# Patient Record
Sex: FEMALE | Race: ASIAN | NOT HISPANIC OR LATINO | Employment: FULL TIME
[De-identification: names, ages, dates, MRNs, and addresses within clinical notes are randomized per-mention and may not be internally consistent; named-entity substitution may affect disease eponyms.]

---

## 2017-01-05 ENCOUNTER — RECORDS - HEALTHEAST (OUTPATIENT)
Dept: ADMINISTRATIVE | Facility: OTHER | Age: 37
End: 2017-01-05

## 2017-01-13 ENCOUNTER — RECORDS - HEALTHEAST (OUTPATIENT)
Dept: ADMINISTRATIVE | Facility: OTHER | Age: 37
End: 2017-01-13

## 2017-01-20 ENCOUNTER — AMBULATORY - HEALTHEAST (OUTPATIENT)
Dept: EDUCATION SERVICES | Facility: CLINIC | Age: 37
End: 2017-01-20

## 2017-02-24 ENCOUNTER — OFFICE VISIT - HEALTHEAST (OUTPATIENT)
Dept: FAMILY MEDICINE | Facility: CLINIC | Age: 37
End: 2017-02-24

## 2017-02-24 DIAGNOSIS — E11.9 TYPE 2 DIABETES MELLITUS WITHOUT COMPLICATION, WITHOUT LONG-TERM CURRENT USE OF INSULIN (H): ICD-10-CM

## 2017-02-24 DIAGNOSIS — Z31.69 INFERTILITY COUNSELING: ICD-10-CM

## 2017-02-24 DIAGNOSIS — E08.65 DIABETES MELLITUS DUE TO UNDERLYING CONDITION WITH HYPERGLYCEMIA (H): ICD-10-CM

## 2017-02-24 DIAGNOSIS — A56.02 CHLAMYDIA VAGINITIS/CERVICITIS: ICD-10-CM

## 2017-02-24 DIAGNOSIS — A56.09 CHLAMYDIA VAGINITIS/CERVICITIS: ICD-10-CM

## 2017-02-24 DIAGNOSIS — Z31.69 ENCOUNTER FOR PRECONCEPTION CONSULTATION: ICD-10-CM

## 2017-02-24 DIAGNOSIS — E66.811 OBESITY (BMI 30.0-34.9): ICD-10-CM

## 2017-02-24 LAB — HBA1C MFR BLD: 6.8 % (ref 3.5–6)

## 2017-02-24 ASSESSMENT — MIFFLIN-ST. JEOR: SCORE: 1386.42

## 2017-02-27 ENCOUNTER — COMMUNICATION - HEALTHEAST (OUTPATIENT)
Dept: FAMILY MEDICINE | Facility: CLINIC | Age: 37
End: 2017-02-27

## 2017-02-28 ENCOUNTER — COMMUNICATION - HEALTHEAST (OUTPATIENT)
Dept: FAMILY MEDICINE | Facility: CLINIC | Age: 37
End: 2017-02-28

## 2017-02-28 ENCOUNTER — RECORDS - HEALTHEAST (OUTPATIENT)
Dept: ADMINISTRATIVE | Facility: OTHER | Age: 37
End: 2017-02-28

## 2017-03-02 ENCOUNTER — HOSPITAL ENCOUNTER (OUTPATIENT)
Dept: RADIOLOGY | Facility: HOSPITAL | Age: 37
Discharge: HOME | End: 2017-03-02
Attending: INTERPRETER

## 2017-03-02 DIAGNOSIS — Z31.49 ENCOUNTER FOR INVESTIGATION AND TESTING FOR PROCREATIVE MANAGEMENT: ICD-10-CM

## 2017-03-16 ENCOUNTER — RECORDS - HEALTHEAST (OUTPATIENT)
Dept: ADMINISTRATIVE | Facility: OTHER | Age: 37
End: 2017-03-16

## 2017-04-21 ENCOUNTER — COMMUNICATION - HEALTHEAST (OUTPATIENT)
Dept: FAMILY MEDICINE | Facility: CLINIC | Age: 37
End: 2017-04-21

## 2017-04-21 ENCOUNTER — OFFICE VISIT - HEALTHEAST (OUTPATIENT)
Dept: EDUCATION SERVICES | Facility: CLINIC | Age: 37
End: 2017-04-21

## 2017-04-21 DIAGNOSIS — Z34.90 PREGNANCY: ICD-10-CM

## 2017-07-24 ENCOUNTER — OFFICE VISIT - HEALTHEAST (OUTPATIENT)
Dept: FAMILY MEDICINE | Facility: CLINIC | Age: 37
End: 2017-07-24

## 2017-07-24 DIAGNOSIS — Z34.91 SUPERVISION OF NORMAL PREGNANCY IN FIRST TRIMESTER: ICD-10-CM

## 2017-07-24 DIAGNOSIS — Z34.91 NORMAL PREGNANCY IN FIRST TRIMESTER: ICD-10-CM

## 2017-07-24 DIAGNOSIS — E11.9 TYPE 2 DIABETES MELLITUS (H): ICD-10-CM

## 2017-07-24 DIAGNOSIS — M54.50 LOWER BACK PAIN: ICD-10-CM

## 2017-07-24 ASSESSMENT — MIFFLIN-ST. JEOR: SCORE: 1368.28

## 2017-07-26 ENCOUNTER — AMBULATORY - HEALTHEAST (OUTPATIENT)
Dept: LAB | Facility: CLINIC | Age: 37
End: 2017-07-26

## 2017-07-26 DIAGNOSIS — M54.50 LOWER BACK PAIN: ICD-10-CM

## 2017-07-26 DIAGNOSIS — E11.9 TYPE 2 DIABETES MELLITUS (H): ICD-10-CM

## 2017-07-26 DIAGNOSIS — Z34.91 SUPERVISION OF NORMAL PREGNANCY IN FIRST TRIMESTER: ICD-10-CM

## 2017-07-26 LAB
HBA1C MFR BLD: 6.8 % (ref 3.5–6)
HIV 1+2 AB+HIV1 P24 AG SERPL QL IA: NEGATIVE

## 2017-07-27 LAB
HBV SURFACE AG SERPL QL IA: NEGATIVE
SYPHILIS RPR SCREEN - HISTORICAL: NORMAL

## 2017-08-01 ENCOUNTER — OFFICE VISIT - HEALTHEAST (OUTPATIENT)
Dept: FAMILY MEDICINE | Facility: CLINIC | Age: 37
End: 2017-08-01

## 2017-08-01 DIAGNOSIS — Z34.90 PREGNANCY: ICD-10-CM

## 2017-08-01 DIAGNOSIS — R31.9 HEMATURIA: ICD-10-CM

## 2017-08-02 ENCOUNTER — PRENATAL OFFICE VISIT - HEALTHEAST (OUTPATIENT)
Dept: FAMILY MEDICINE | Facility: CLINIC | Age: 37
End: 2017-08-02

## 2017-08-02 DIAGNOSIS — E11.9 TYPE 2 DIABETES MELLITUS (H): ICD-10-CM

## 2017-08-02 DIAGNOSIS — Z34.91 NORMAL PREGNANCY IN FIRST TRIMESTER: ICD-10-CM

## 2017-08-04 ENCOUNTER — AMBULATORY - HEALTHEAST (OUTPATIENT)
Dept: FAMILY MEDICINE | Facility: CLINIC | Age: 37
End: 2017-08-04

## 2017-08-04 ENCOUNTER — AMBULATORY - HEALTHEAST (OUTPATIENT)
Dept: LAB | Facility: CLINIC | Age: 37
End: 2017-08-04

## 2017-08-04 DIAGNOSIS — Z34.91 NORMAL PREGNANCY IN FIRST TRIMESTER: ICD-10-CM

## 2017-08-11 ENCOUNTER — AMBULATORY - HEALTHEAST (OUTPATIENT)
Dept: EDUCATION SERVICES | Facility: CLINIC | Age: 37
End: 2017-08-11

## 2017-08-11 DIAGNOSIS — E08.65 DIABETES MELLITUS DUE TO UNDERLYING CONDITION WITH HYPERGLYCEMIA (H): ICD-10-CM

## 2017-08-14 ENCOUNTER — COMMUNICATION - HEALTHEAST (OUTPATIENT)
Dept: ADMINISTRATIVE | Facility: CLINIC | Age: 37
End: 2017-08-14

## 2017-08-17 ENCOUNTER — OFFICE VISIT - HEALTHEAST (OUTPATIENT)
Dept: EDUCATION SERVICES | Facility: CLINIC | Age: 37
End: 2017-08-17

## 2017-08-21 ENCOUNTER — AMBULATORY - HEALTHEAST (OUTPATIENT)
Dept: OBGYN | Facility: CLINIC | Age: 37
End: 2017-08-21

## 2017-08-21 ENCOUNTER — HOSPITAL ENCOUNTER (OUTPATIENT)
Dept: ULTRASOUND IMAGING | Facility: HOSPITAL | Age: 37
Discharge: HOME | End: 2017-08-21
Attending: FAMILY MEDICINE

## 2017-08-21 DIAGNOSIS — Z34.91 SUPERVISION OF NORMAL PREGNANCY IN FIRST TRIMESTER: ICD-10-CM

## 2017-08-24 ENCOUNTER — COMMUNICATION - HEALTHEAST (OUTPATIENT)
Dept: ADMINISTRATIVE | Facility: CLINIC | Age: 37
End: 2017-08-24

## 2017-08-24 ENCOUNTER — COMMUNICATION - HEALTHEAST (OUTPATIENT)
Dept: OBGYN | Facility: CLINIC | Age: 37
End: 2017-08-24

## 2017-08-31 ENCOUNTER — PRENATAL OFFICE VISIT - HEALTHEAST (OUTPATIENT)
Dept: FAMILY MEDICINE | Facility: CLINIC | Age: 37
End: 2017-08-31

## 2017-08-31 DIAGNOSIS — Z98.891 HISTORY OF CLASSICAL CESAREAN SECTION: ICD-10-CM

## 2017-08-31 DIAGNOSIS — J06.9 UPPER RESPIRATORY INFECTION: ICD-10-CM

## 2017-08-31 DIAGNOSIS — E11.9 TYPE 2 DIABETES MELLITUS (H): ICD-10-CM

## 2017-08-31 DIAGNOSIS — Z34.91 NORMAL PREGNANCY IN FIRST TRIMESTER: ICD-10-CM

## 2017-08-31 DIAGNOSIS — O09.91 HIGH-RISK PREGNANCY, FIRST TRIMESTER: ICD-10-CM

## 2017-08-31 ASSESSMENT — MIFFLIN-ST. JEOR: SCORE: 1368.85

## 2017-09-07 LAB
HPV INTERPRETATION - HISTORICAL: NORMAL
HPV INTERPRETER - HISTORICAL: NORMAL

## 2017-09-13 LAB
BKR LAB AP ABNORMAL BLEEDING: NO
BKR LAB AP BIRTH CONTROL/HORMONES: ABNORMAL
BKR LAB AP CERVICAL APPEARANCE: NORMAL
BKR LAB AP GYN ADEQUACY: ABNORMAL
BKR LAB AP GYN INTERPRETATION: ABNORMAL
BKR LAB AP HPV REFLEX: ABNORMAL
BKR LAB AP LMP: ABNORMAL
BKR LAB AP PATIENT STATUS: ABNORMAL
BKR LAB AP PREVIOUS ABNORMAL: ABNORMAL
BKR LAB AP PREVIOUS NORMAL: ABNORMAL
HIGH RISK?: NO
INTERPRETING LAB: ABNORMAL
PATH REPORT.COMMENTS IMP SPEC: ABNORMAL
RESULT FLAG (HE HISTORICAL CONVERSION): ABNORMAL

## 2017-09-14 ENCOUNTER — COMMUNICATION - HEALTHEAST (OUTPATIENT)
Dept: FAMILY MEDICINE | Facility: CLINIC | Age: 37
End: 2017-09-14

## 2017-09-15 ENCOUNTER — OFFICE VISIT - HEALTHEAST (OUTPATIENT)
Dept: FAMILY MEDICINE | Facility: CLINIC | Age: 37
End: 2017-09-15

## 2017-09-15 DIAGNOSIS — R87.612 PAP SMEAR ABNORMALITY OF CERVIX WITH LGSIL: ICD-10-CM

## 2017-09-15 DIAGNOSIS — O09.91 HIGH-RISK PREGNANCY, FIRST TRIMESTER: ICD-10-CM

## 2017-09-15 ASSESSMENT — MIFFLIN-ST. JEOR: SCORE: 1368.85

## 2017-09-22 ENCOUNTER — COMMUNICATION - HEALTHEAST (OUTPATIENT)
Dept: FAMILY MEDICINE | Facility: CLINIC | Age: 37
End: 2017-09-22

## 2017-09-22 DIAGNOSIS — E11.9 TYPE 2 DIABETES MELLITUS (H): ICD-10-CM

## 2017-09-22 DIAGNOSIS — R87.612 PAP SMEAR ABNORMALITY OF CERVIX WITH LGSIL: ICD-10-CM

## 2017-09-22 DIAGNOSIS — O09.91 HIGH-RISK PREGNANCY, FIRST TRIMESTER: ICD-10-CM

## 2017-09-25 ENCOUNTER — COMMUNICATION - HEALTHEAST (OUTPATIENT)
Dept: ADMINISTRATIVE | Facility: CLINIC | Age: 37
End: 2017-09-25

## 2017-10-06 ENCOUNTER — COMMUNICATION - HEALTHEAST (OUTPATIENT)
Dept: FAMILY MEDICINE | Facility: CLINIC | Age: 37
End: 2017-10-06

## 2017-10-12 ENCOUNTER — COMMUNICATION - HEALTHEAST (OUTPATIENT)
Dept: FAMILY MEDICINE | Facility: CLINIC | Age: 37
End: 2017-10-12

## 2017-10-16 ENCOUNTER — COMMUNICATION - HEALTHEAST (OUTPATIENT)
Dept: FAMILY MEDICINE | Facility: CLINIC | Age: 37
End: 2017-10-16

## 2017-10-23 ENCOUNTER — TRANSFERRED RECORDS (OUTPATIENT)
Dept: HEALTH INFORMATION MANAGEMENT | Facility: CLINIC | Age: 37
End: 2017-10-23

## 2017-10-23 ENCOUNTER — MEDICAL CORRESPONDENCE (OUTPATIENT)
Dept: HEALTH INFORMATION MANAGEMENT | Facility: CLINIC | Age: 37
End: 2017-10-23

## 2017-10-26 ENCOUNTER — RECORDS - HEALTHEAST (OUTPATIENT)
Dept: ADMINISTRATIVE | Facility: OTHER | Age: 37
End: 2017-10-26

## 2017-10-26 ENCOUNTER — OFFICE VISIT - HEALTHEAST (OUTPATIENT)
Dept: EDUCATION SERVICES | Facility: CLINIC | Age: 37
End: 2017-10-26

## 2017-10-26 DIAGNOSIS — O24.112 PRE-EXISTING TYPE 2 DIABETES MELLITUS DURING PREGNANCY IN SECOND TRIMESTER: ICD-10-CM

## 2017-10-26 DIAGNOSIS — E11.9 DIABETES (H): ICD-10-CM

## 2017-10-26 LAB — HBA1C MFR BLD: 6 % (ref 3.5–6)

## 2017-11-01 ENCOUNTER — AMBULATORY - HEALTHEAST (OUTPATIENT)
Dept: EDUCATION SERVICES | Facility: CLINIC | Age: 37
End: 2017-11-01

## 2017-11-01 DIAGNOSIS — O24.419 GESTATIONAL DIABETES: ICD-10-CM

## 2017-11-06 ENCOUNTER — RECORDS - HEALTHEAST (OUTPATIENT)
Dept: ADMINISTRATIVE | Facility: OTHER | Age: 37
End: 2017-11-06

## 2017-12-01 ENCOUNTER — COMMUNICATION - HEALTHEAST (OUTPATIENT)
Dept: FAMILY MEDICINE | Facility: CLINIC | Age: 37
End: 2017-12-01

## 2017-12-05 ENCOUNTER — TELEPHONE (OUTPATIENT)
Dept: MATERNAL FETAL MEDICINE | Facility: CLINIC | Age: 37
End: 2017-12-05

## 2017-12-05 ENCOUNTER — COMMUNICATION - HEALTHEAST (OUTPATIENT)
Dept: FAMILY MEDICINE | Facility: CLINIC | Age: 37
End: 2017-12-05

## 2017-12-05 DIAGNOSIS — O43.219 PLACENTA ACCRETA: ICD-10-CM

## 2017-12-05 DIAGNOSIS — O09.91 HIGH-RISK PREGNANCY IN FIRST TRIMESTER: ICD-10-CM

## 2017-12-05 DIAGNOSIS — E28.2 POLYCYSTIC OVARIAN SYNDROME: ICD-10-CM

## 2017-12-05 DIAGNOSIS — O34.10 UTERINE FIBROIDS AFFECTING PREGNANCY: ICD-10-CM

## 2017-12-05 DIAGNOSIS — Z98.891 HISTORY OF CLASSICAL CESAREAN SECTION: ICD-10-CM

## 2017-12-05 DIAGNOSIS — D25.9 UTERINE FIBROIDS AFFECTING PREGNANCY: ICD-10-CM

## 2017-12-05 DIAGNOSIS — R87.612 PAP SMEAR ABNORMALITY OF CERVIX WITH LGSIL: ICD-10-CM

## 2017-12-05 DIAGNOSIS — E66.811 OBESITY (BMI 30.0-34.9): ICD-10-CM

## 2017-12-05 DIAGNOSIS — Z64.1 GRAND MULTIPARITY: ICD-10-CM

## 2017-12-05 DIAGNOSIS — E11.65 TYPE 2 DIABETES MELLITUS WITH HYPERGLYCEMIA (H): ICD-10-CM

## 2017-12-05 NOTE — TELEPHONE ENCOUNTER
Writer called provider wanting to know if provider still wants MFM to see patient. Last phone conversation was on 11/14 regarding patient not wanting to come to [facility], and did not understand why she needs to come here when she is seeing her OB.     Left message and phone number for provider to call back.    eJnnifer Link,    , [facility]

## 2017-12-11 ENCOUNTER — TELEPHONE (OUTPATIENT)
Dept: MATERNAL FETAL MEDICINE | Facility: CLINIC | Age: 37
End: 2017-12-11

## 2017-12-11 NOTE — TELEPHONE ENCOUNTER
[facility] received a referral from provider wanting patient to be seen for GC/US. Upon reaching patient x2, she refuse to come to [facility] not knowing why she is being referred over. On 11/4, [facility]  called provider and asking if they could talk to with patient in regards of the referral and ask to call MFM back. MFM have not heard back yet.     12/5, [facility]  called provider in regards to phone call on 11/4, and left msg for provider could call back. However, [facility]  did not receive a call back and called a week after leaving another message to the provider. 2/11, [facility]  called clinic again but was not able to touch base with provider.  Left message again letting provider know that patient's order will be removed and for provider to resubmit another referral to [facility] when patient is ready.      Referring clinic notified. Removing order.    Jennifer Link,    , [facility]

## 2018-01-31 ENCOUNTER — AMBULATORY - HEALTHEAST (OUTPATIENT)
Dept: EDUCATION SERVICES | Facility: CLINIC | Age: 38
End: 2018-01-31

## 2018-01-31 DIAGNOSIS — E11.9 DIABETES (H): ICD-10-CM

## 2018-01-31 DIAGNOSIS — O24.414 INSULIN CONTROLLED GESTATIONAL DIABETES MELLITUS (GDM) DURING PREGNANCY, ANTEPARTUM: ICD-10-CM

## 2018-02-01 ENCOUNTER — COMMUNICATION - HEALTHEAST (OUTPATIENT)
Dept: ENDOCRINOLOGY | Facility: CLINIC | Age: 38
End: 2018-02-01

## 2018-02-01 ENCOUNTER — OFFICE VISIT - HEALTHEAST (OUTPATIENT)
Dept: ENDOCRINOLOGY | Facility: CLINIC | Age: 38
End: 2018-02-01

## 2018-02-01 ENCOUNTER — OFFICE VISIT - HEALTHEAST (OUTPATIENT)
Dept: EDUCATION SERVICES | Facility: CLINIC | Age: 38
End: 2018-02-01

## 2018-02-01 DIAGNOSIS — E11.65 TYPE 2 DIABETES MELLITUS WITH HYPERGLYCEMIA (H): ICD-10-CM

## 2018-02-01 DIAGNOSIS — O24.419 GESTATIONAL DIABETES: ICD-10-CM

## 2018-02-01 DIAGNOSIS — O24.414 INSULIN CONTROLLED GESTATIONAL DIABETES MELLITUS (GDM) IN THIRD TRIMESTER: ICD-10-CM

## 2018-02-01 DIAGNOSIS — Z3A.30 30 WEEKS GESTATION OF PREGNANCY: ICD-10-CM

## 2018-02-01 DIAGNOSIS — E11.9 DIABETES (H): ICD-10-CM

## 2018-02-01 DIAGNOSIS — O24.414 INSULIN CONTROLLED GESTATIONAL DIABETES MELLITUS (GDM) DURING PREGNANCY, ANTEPARTUM: ICD-10-CM

## 2018-02-01 ASSESSMENT — MIFFLIN-ST. JEOR: SCORE: 1448.8

## 2018-02-02 ENCOUNTER — COMMUNICATION - HEALTHEAST (OUTPATIENT)
Dept: LAB | Facility: CLINIC | Age: 38
End: 2018-02-02

## 2018-02-02 ENCOUNTER — AMBULATORY - HEALTHEAST (OUTPATIENT)
Dept: ENDOCRINOLOGY | Facility: CLINIC | Age: 38
End: 2018-02-02

## 2018-02-02 DIAGNOSIS — E11.65 TYPE 2 DIABETES MELLITUS WITH HYPERGLYCEMIA (H): ICD-10-CM

## 2018-03-07 ASSESSMENT — MIFFLIN-ST. JEOR: SCORE: 1525.91

## 2018-03-08 ENCOUNTER — RECORDS - HEALTHEAST (OUTPATIENT)
Dept: ADMINISTRATIVE | Facility: OTHER | Age: 38
End: 2018-03-08

## 2018-03-08 ENCOUNTER — SURGERY - HEALTHEAST (OUTPATIENT)
Dept: OBGYN | Facility: HOSPITAL | Age: 38
End: 2018-03-08

## 2018-03-08 ENCOUNTER — ANESTHESIA - HEALTHEAST (OUTPATIENT)
Dept: OBGYN | Facility: HOSPITAL | Age: 38
End: 2018-03-08

## 2018-03-08 ASSESSMENT — MIFFLIN-ST. JEOR: SCORE: 1519.1

## 2018-03-13 ENCOUNTER — HOME CARE/HOSPICE - HEALTHEAST (OUTPATIENT)
Dept: HOME HEALTH SERVICES | Facility: HOME HEALTH | Age: 38
End: 2018-03-13

## 2018-03-16 ENCOUNTER — HOME CARE/HOSPICE - HEALTHEAST (OUTPATIENT)
Dept: HOME HEALTH SERVICES | Facility: HOME HEALTH | Age: 38
End: 2018-03-16

## 2018-05-10 ENCOUNTER — OFFICE VISIT - HEALTHEAST (OUTPATIENT)
Dept: FAMILY MEDICINE | Facility: CLINIC | Age: 38
End: 2018-05-10

## 2018-05-10 DIAGNOSIS — E08.65 DIABETES MELLITUS DUE TO UNDERLYING CONDITION WITH HYPERGLYCEMIA (H): ICD-10-CM

## 2018-05-10 DIAGNOSIS — R87.612 PAP SMEAR ABNORMALITY OF CERVIX WITH LGSIL: ICD-10-CM

## 2018-05-10 DIAGNOSIS — E11.65 TYPE 2 DIABETES MELLITUS WITH HYPERGLYCEMIA (H): ICD-10-CM

## 2018-05-10 DIAGNOSIS — E78.5 HYPERLIPIDEMIA: ICD-10-CM

## 2018-05-10 DIAGNOSIS — N93.9 ABNORMAL UTERINE BLEEDING: ICD-10-CM

## 2018-05-10 LAB
ALBUMIN SERPL-MCNC: 4 G/DL (ref 3.5–5)
ALP SERPL-CCNC: 101 U/L (ref 45–120)
ALT SERPL W P-5'-P-CCNC: 24 U/L (ref 0–45)
ANION GAP SERPL CALCULATED.3IONS-SCNC: 8 MMOL/L (ref 5–18)
AST SERPL W P-5'-P-CCNC: 15 U/L (ref 0–40)
BILIRUB SERPL-MCNC: 0.6 MG/DL (ref 0–1)
BUN SERPL-MCNC: 13 MG/DL (ref 8–22)
CALCIUM SERPL-MCNC: 9.2 MG/DL (ref 8.5–10.5)
CHLORIDE BLD-SCNC: 107 MMOL/L (ref 98–107)
CHOLEST SERPL-MCNC: 327 MG/DL
CLUE CELLS: NORMAL
CO2 SERPL-SCNC: 25 MMOL/L (ref 22–31)
CREAT SERPL-MCNC: 0.6 MG/DL (ref 0.6–1.1)
CREAT UR-MCNC: 85 MG/DL
ERYTHROCYTE [DISTWIDTH] IN BLOOD BY AUTOMATED COUNT: 11.3 % (ref 11–14.5)
FASTING STATUS PATIENT QL REPORTED: NO
GFR SERPL CREATININE-BSD FRML MDRD: >60 ML/MIN/1.73M2
GLUCOSE BLD-MCNC: 135 MG/DL (ref 70–125)
HBA1C MFR BLD: 7.1 % (ref 3.5–6)
HCG UR QL: NEGATIVE
HCT VFR BLD AUTO: 37 % (ref 35–47)
HDLC SERPL-MCNC: 53 MG/DL
HGB BLD-MCNC: 11.9 G/DL (ref 12–16)
LDLC SERPL CALC-MCNC: 248 MG/DL
MCH RBC QN AUTO: 28.9 PG (ref 27–34)
MCHC RBC AUTO-ENTMCNC: 32.1 G/DL (ref 32–36)
MCV RBC AUTO: 90 FL (ref 80–100)
MICROALBUMIN UR-MCNC: 0.86 MG/DL (ref 0–1.99)
MICROALBUMIN/CREAT UR: 10.1 MG/G
PLATELET # BLD AUTO: 257 THOU/UL (ref 140–440)
PMV BLD AUTO: 6.5 FL (ref 7–10)
POTASSIUM BLD-SCNC: 3.9 MMOL/L (ref 3.5–5)
PROT SERPL-MCNC: 7.2 G/DL (ref 6–8)
RBC # BLD AUTO: 4.11 MILL/UL (ref 3.8–5.4)
SODIUM SERPL-SCNC: 140 MMOL/L (ref 136–145)
SP GR UR STRIP: 1.02 (ref 1–1.03)
TRICHOMONAS, WET PREP: NORMAL
TRIGL SERPL-MCNC: 131 MG/DL
TSH SERPL DL<=0.005 MIU/L-ACNC: 2.46 UIU/ML (ref 0.3–5)
WBC: 5.5 THOU/UL (ref 4–11)
YEAST, WET PREP: NORMAL

## 2018-05-10 RX ORDER — GLUCOSAMINE HCL/CHONDROITIN SU 500-400 MG
CAPSULE ORAL
Qty: 120 STRIP | Refills: 10 | Status: SHIPPED | OUTPATIENT
Start: 2018-05-10

## 2018-05-10 ASSESSMENT — MIFFLIN-ST. JEOR: SCORE: 1396.64

## 2018-05-11 ENCOUNTER — COMMUNICATION - HEALTHEAST (OUTPATIENT)
Dept: FAMILY MEDICINE | Facility: CLINIC | Age: 38
End: 2018-05-11

## 2018-05-11 LAB
C TRACH DNA SPEC QL PROBE+SIG AMP: NEGATIVE
HPV SOURCE: NORMAL
HUMAN PAPILLOMA VIRUS 16 DNA: NEGATIVE
HUMAN PAPILLOMA VIRUS 18 DNA: NEGATIVE
HUMAN PAPILLOMA VIRUS FINAL DIAGNOSIS: NORMAL
HUMAN PAPILLOMA VIRUS OTHER HR: NEGATIVE
N GONORRHOEA DNA SPEC QL NAA+PROBE: NEGATIVE
SPECIMEN DESCRIPTION: NORMAL

## 2018-05-17 ENCOUNTER — OFFICE VISIT - HEALTHEAST (OUTPATIENT)
Dept: FAMILY MEDICINE | Facility: CLINIC | Age: 38
End: 2018-05-17

## 2018-05-17 DIAGNOSIS — Z23 NEED FOR HEPATITIS B VACCINATION: ICD-10-CM

## 2018-05-17 DIAGNOSIS — R87.612 PAP SMEAR ABNORMALITY OF CERVIX WITH LGSIL: ICD-10-CM

## 2018-05-17 DIAGNOSIS — E78.5 HYPERLIPIDEMIA: ICD-10-CM

## 2018-05-17 DIAGNOSIS — E11.65 TYPE 2 DIABETES MELLITUS WITH HYPERGLYCEMIA, WITHOUT LONG-TERM CURRENT USE OF INSULIN (H): ICD-10-CM

## 2018-05-17 DIAGNOSIS — D50.9 IRON DEFICIENCY ANEMIA: ICD-10-CM

## 2018-05-17 LAB
BKR LAB AP ABNORMAL BLEEDING: YES
BKR LAB AP BIRTH CONTROL/HORMONES: NORMAL
BKR LAB AP CERVICAL APPEARANCE: NORMAL
BKR LAB AP GYN ADEQUACY: NORMAL
BKR LAB AP GYN INTERPRETATION: NORMAL
BKR LAB AP HPV REFLEX: NORMAL
BKR LAB AP LMP: NORMAL
BKR LAB AP PATIENT STATUS: NORMAL
BKR LAB AP PREVIOUS ABNORMAL: NORMAL
BKR LAB AP PREVIOUS NORMAL: NORMAL
CHOLEST SERPL-MCNC: 314 MG/DL
FASTING STATUS PATIENT QL REPORTED: YES
HDLC SERPL-MCNC: 62 MG/DL
HIGH RISK?: YES
LDLC SERPL CALC-MCNC: 221 MG/DL
PATH REPORT.COMMENTS IMP SPEC: NORMAL
RESULT FLAG (HE HISTORICAL CONVERSION): NORMAL
TRIGL SERPL-MCNC: 156 MG/DL

## 2018-05-18 ENCOUNTER — COMMUNICATION - HEALTHEAST (OUTPATIENT)
Dept: FAMILY MEDICINE | Facility: CLINIC | Age: 38
End: 2018-05-18

## 2018-06-26 ENCOUNTER — COMMUNICATION - HEALTHEAST (OUTPATIENT)
Dept: FAMILY MEDICINE | Facility: CLINIC | Age: 38
End: 2018-06-26

## 2018-08-17 ENCOUNTER — AMBULATORY - HEALTHEAST (OUTPATIENT)
Dept: FAMILY MEDICINE | Facility: CLINIC | Age: 38
End: 2018-08-17

## 2018-08-17 DIAGNOSIS — E78.49 FAMILIAL HYPERLIPIDEMIA, HIGH LDL: ICD-10-CM

## 2018-08-17 DIAGNOSIS — D50.9 IRON DEFICIENCY ANEMIA: ICD-10-CM

## 2018-08-17 DIAGNOSIS — E11.65 TYPE 2 DIABETES MELLITUS WITH HYPERGLYCEMIA, WITHOUT LONG-TERM CURRENT USE OF INSULIN (H): ICD-10-CM

## 2018-11-19 ENCOUNTER — COMMUNICATION - HEALTHEAST (OUTPATIENT)
Dept: FAMILY MEDICINE | Facility: CLINIC | Age: 38
End: 2018-11-19

## 2018-11-19 ENCOUNTER — OFFICE VISIT - HEALTHEAST (OUTPATIENT)
Dept: FAMILY MEDICINE | Facility: CLINIC | Age: 38
End: 2018-11-19

## 2018-11-19 DIAGNOSIS — E66.811 OBESITY (BMI 30.0-34.9): ICD-10-CM

## 2018-11-19 DIAGNOSIS — E11.65 TYPE 2 DIABETES MELLITUS WITH HYPERGLYCEMIA, WITHOUT LONG-TERM CURRENT USE OF INSULIN (H): ICD-10-CM

## 2018-11-19 DIAGNOSIS — D50.0 IRON DEFICIENCY ANEMIA DUE TO CHRONIC BLOOD LOSS: ICD-10-CM

## 2018-11-19 DIAGNOSIS — R87.612 PAP SMEAR ABNORMALITY OF CERVIX WITH LGSIL: ICD-10-CM

## 2018-11-19 DIAGNOSIS — N93.9 ABNORMAL UTERINE BLEEDING: ICD-10-CM

## 2018-11-19 DIAGNOSIS — Z23 NEED FOR HEPATITIS B VACCINATION: ICD-10-CM

## 2018-11-19 DIAGNOSIS — E28.2 POLYCYSTIC OVARIAN SYNDROME: ICD-10-CM

## 2018-11-19 DIAGNOSIS — Z31.69 ENCOUNTER FOR PRECONCEPTION CONSULTATION: ICD-10-CM

## 2018-11-19 DIAGNOSIS — E78.49 FAMILIAL HYPERLIPIDEMIA, HIGH LDL: ICD-10-CM

## 2018-11-19 DIAGNOSIS — D21.9 FIBROIDS: ICD-10-CM

## 2018-11-19 LAB
HBA1C MFR BLD: 7.2 % (ref 3.5–6)
HGB BLD-MCNC: 12.7 G/DL (ref 12–16)
IRON SATN MFR SERPL: 9 % (ref 20–50)
IRON SERPL-MCNC: 38 UG/DL (ref 42–175)
LDLC SERPL CALC-MCNC: 210 MG/DL
TIBC SERPL-MCNC: 424 UG/DL (ref 313–563)
TRANSFERRIN SERPL-MCNC: 339 MG/DL (ref 212–360)

## 2018-11-20 ENCOUNTER — COMMUNICATION - HEALTHEAST (OUTPATIENT)
Dept: FAMILY MEDICINE | Facility: CLINIC | Age: 38
End: 2018-11-20

## 2019-05-20 ENCOUNTER — COMMUNICATION - HEALTHEAST (OUTPATIENT)
Dept: SCHEDULING | Facility: CLINIC | Age: 39
End: 2019-05-20

## 2019-05-21 ENCOUNTER — OFFICE VISIT - HEALTHEAST (OUTPATIENT)
Dept: FAMILY MEDICINE | Facility: CLINIC | Age: 39
End: 2019-05-21

## 2019-05-21 DIAGNOSIS — R10.31 RIGHT LOWER QUADRANT ABDOMINAL PAIN: ICD-10-CM

## 2019-05-21 DIAGNOSIS — D50.0 IRON DEFICIENCY ANEMIA DUE TO CHRONIC BLOOD LOSS: ICD-10-CM

## 2019-05-21 DIAGNOSIS — E78.49 FAMILIAL HYPERLIPIDEMIA, HIGH LDL: ICD-10-CM

## 2019-05-21 DIAGNOSIS — Z31.69 ENCOUNTER FOR PRECONCEPTION CONSULTATION: ICD-10-CM

## 2019-05-21 DIAGNOSIS — R87.612 PAP SMEAR ABNORMALITY OF CERVIX WITH LGSIL: ICD-10-CM

## 2019-05-21 DIAGNOSIS — E11.65 TYPE 2 DIABETES MELLITUS WITH HYPERGLYCEMIA, WITHOUT LONG-TERM CURRENT USE OF INSULIN (H): ICD-10-CM

## 2019-05-21 DIAGNOSIS — N93.9 ABNORMAL UTERINE BLEEDING: ICD-10-CM

## 2019-05-21 LAB
ALBUMIN SERPL-MCNC: 4.5 G/DL (ref 3.5–5)
ALP SERPL-CCNC: 64 U/L (ref 45–120)
ALT SERPL W P-5'-P-CCNC: 14 U/L (ref 0–45)
ANION GAP SERPL CALCULATED.3IONS-SCNC: 13 MMOL/L (ref 5–18)
AST SERPL W P-5'-P-CCNC: 13 U/L (ref 0–40)
BILIRUB SERPL-MCNC: 0.8 MG/DL (ref 0–1)
BUN SERPL-MCNC: 8 MG/DL (ref 8–22)
CALCIUM SERPL-MCNC: 10 MG/DL (ref 8.5–10.5)
CHLORIDE BLD-SCNC: 102 MMOL/L (ref 98–107)
CHOLEST SERPL-MCNC: 292 MG/DL
CLUE CELLS: NORMAL
CO2 SERPL-SCNC: 25 MMOL/L (ref 22–31)
CREAT SERPL-MCNC: 0.62 MG/DL (ref 0.6–1.1)
CREAT UR-MCNC: 137.1 MG/DL
ERYTHROCYTE [DISTWIDTH] IN BLOOD BY AUTOMATED COUNT: 11.2 % (ref 11–14.5)
FASTING STATUS PATIENT QL REPORTED: YES
GFR SERPL CREATININE-BSD FRML MDRD: >60 ML/MIN/1.73M2
GLUCOSE BLD-MCNC: 127 MG/DL (ref 70–125)
HCG UR QL: NEGATIVE
HCT VFR BLD AUTO: 40.9 % (ref 35–47)
HDLC SERPL-MCNC: 68 MG/DL
HGB BLD-MCNC: 13.8 G/DL (ref 12–16)
IRON SATN MFR SERPL: 16 % (ref 20–50)
IRON SERPL-MCNC: 74 UG/DL (ref 42–175)
LDLC SERPL CALC-MCNC: 204 MG/DL
MCH RBC QN AUTO: 29.8 PG (ref 27–34)
MCHC RBC AUTO-ENTMCNC: 33.7 G/DL (ref 32–36)
MCV RBC AUTO: 88 FL (ref 80–100)
MICROALBUMIN UR-MCNC: 1.49 MG/DL (ref 0–1.99)
MICROALBUMIN/CREAT UR: 10.9 MG/G
PLATELET # BLD AUTO: 267 THOU/UL (ref 140–440)
PMV BLD AUTO: 7.2 FL (ref 7–10)
POTASSIUM BLD-SCNC: 4.1 MMOL/L (ref 3.5–5)
PROT SERPL-MCNC: 8 G/DL (ref 6–8)
RBC # BLD AUTO: 4.62 MILL/UL (ref 3.8–5.4)
SODIUM SERPL-SCNC: 140 MMOL/L (ref 136–145)
TIBC SERPL-MCNC: 469 UG/DL (ref 313–563)
TRANSFERRIN SERPL-MCNC: 375 MG/DL (ref 212–360)
TRICHOMONAS, WET PREP: NORMAL
TRIGL SERPL-MCNC: 101 MG/DL
TSH SERPL DL<=0.005 MIU/L-ACNC: 1.35 UIU/ML (ref 0.3–5)
WBC: 6.2 THOU/UL (ref 4–11)
YEAST, WET PREP: NORMAL

## 2019-05-22 ENCOUNTER — HOSPITAL ENCOUNTER (OUTPATIENT)
Dept: ULTRASOUND IMAGING | Facility: HOSPITAL | Age: 39
Discharge: HOME | End: 2019-05-22
Attending: FAMILY MEDICINE

## 2019-05-22 DIAGNOSIS — R10.31 RIGHT LOWER QUADRANT ABDOMINAL PAIN: ICD-10-CM

## 2019-05-22 LAB
25(OH)D3 SERPL-MCNC: 15.8 NG/ML (ref 30–80)
HPV SOURCE: NORMAL
HUMAN PAPILLOMA VIRUS 16 DNA: NEGATIVE
HUMAN PAPILLOMA VIRUS 18 DNA: NEGATIVE
HUMAN PAPILLOMA VIRUS FINAL DIAGNOSIS: NORMAL
HUMAN PAPILLOMA VIRUS OTHER HR: NEGATIVE
SPECIMEN DESCRIPTION: NORMAL

## 2019-05-23 LAB
C TRACH DNA SPEC QL PROBE+SIG AMP: POSITIVE
N GONORRHOEA DNA SPEC QL NAA+PROBE: NEGATIVE

## 2019-05-24 ENCOUNTER — COMMUNICATION - HEALTHEAST (OUTPATIENT)
Dept: FAMILY MEDICINE | Facility: CLINIC | Age: 39
End: 2019-05-24

## 2019-05-31 ENCOUNTER — COMMUNICATION - HEALTHEAST (OUTPATIENT)
Dept: FAMILY MEDICINE | Facility: CLINIC | Age: 39
End: 2019-05-31

## 2019-06-03 ENCOUNTER — COMMUNICATION - HEALTHEAST (OUTPATIENT)
Dept: FAMILY MEDICINE | Facility: CLINIC | Age: 39
End: 2019-06-03

## 2019-07-01 ENCOUNTER — HOSPITAL ENCOUNTER (OUTPATIENT)
Facility: AMBULATORY SURGERY CENTER | Age: 39
End: 2019-07-01
Attending: PLASTIC SURGERY | Admitting: PLASTIC SURGERY

## 2019-07-11 ENCOUNTER — OFFICE VISIT - HEALTHEAST (OUTPATIENT)
Dept: FAMILY MEDICINE | Facility: CLINIC | Age: 39
End: 2019-07-11

## 2019-07-11 DIAGNOSIS — E78.49 FAMILIAL HYPERLIPIDEMIA, HIGH LDL: ICD-10-CM

## 2019-07-11 DIAGNOSIS — D50.0 IRON DEFICIENCY ANEMIA DUE TO CHRONIC BLOOD LOSS: ICD-10-CM

## 2019-07-11 DIAGNOSIS — Z01.818 PREOPERATIVE EXAMINATION: ICD-10-CM

## 2019-07-11 DIAGNOSIS — Z86.19 HISTORY OF CHLAMYDIA INFECTION: ICD-10-CM

## 2019-07-11 DIAGNOSIS — E11.65 TYPE 2 DIABETES MELLITUS WITH HYPERGLYCEMIA, WITHOUT LONG-TERM CURRENT USE OF INSULIN (H): ICD-10-CM

## 2019-07-11 LAB
ALBUMIN SERPL-MCNC: 4.3 G/DL (ref 3.5–5)
ALP SERPL-CCNC: 56 U/L (ref 45–120)
ALT SERPL W P-5'-P-CCNC: 16 U/L (ref 0–45)
ANION GAP SERPL CALCULATED.3IONS-SCNC: 11 MMOL/L (ref 5–18)
AST SERPL W P-5'-P-CCNC: 12 U/L (ref 0–40)
BILIRUB SERPL-MCNC: 1 MG/DL (ref 0–1)
BUN SERPL-MCNC: 15 MG/DL (ref 8–22)
CALCIUM SERPL-MCNC: 10.2 MG/DL (ref 8.5–10.5)
CHLORIDE BLD-SCNC: 103 MMOL/L (ref 98–107)
CHOLEST SERPL-MCNC: 297 MG/DL
CO2 SERPL-SCNC: 25 MMOL/L (ref 22–31)
CREAT SERPL-MCNC: 0.66 MG/DL (ref 0.6–1.1)
ERYTHROCYTE [DISTWIDTH] IN BLOOD BY AUTOMATED COUNT: 11.8 % (ref 11–14.5)
FASTING STATUS PATIENT QL REPORTED: NO
GFR SERPL CREATININE-BSD FRML MDRD: >60 ML/MIN/1.73M2
GLUCOSE BLD-MCNC: 109 MG/DL (ref 70–125)
HBA1C MFR BLD: 6.6 % (ref 3.5–6)
HCT VFR BLD AUTO: 38.9 % (ref 35–47)
HDLC SERPL-MCNC: 57 MG/DL
HGB BLD-MCNC: 13.3 G/DL (ref 12–16)
LDLC SERPL CALC-MCNC: 206 MG/DL
MCH RBC QN AUTO: 30.4 PG (ref 27–34)
MCHC RBC AUTO-ENTMCNC: 34.1 G/DL (ref 32–36)
MCV RBC AUTO: 89 FL (ref 80–100)
PLATELET # BLD AUTO: 259 THOU/UL (ref 140–440)
PMV BLD AUTO: 6.7 FL (ref 7–10)
POTASSIUM BLD-SCNC: 4.1 MMOL/L (ref 3.5–5)
PROT SERPL-MCNC: 7.6 G/DL (ref 6–8)
RBC # BLD AUTO: 4.37 MILL/UL (ref 3.8–5.4)
SODIUM SERPL-SCNC: 139 MMOL/L (ref 136–145)
TRIGL SERPL-MCNC: 168 MG/DL
WBC: 6.7 THOU/UL (ref 4–11)

## 2019-07-11 RX ORDER — ONDANSETRON 8 MG/1
TABLET, ORALLY DISINTEGRATING ORAL
Refills: 0 | Status: SHIPPED | COMMUNITY
Start: 2019-07-05

## 2019-07-11 RX ORDER — HYDROCODONE BITARTRATE AND ACETAMINOPHEN 5; 325 MG/1; MG/1
TABLET ORAL
Refills: 0 | Status: SHIPPED | COMMUNITY
Start: 2019-07-05

## 2019-07-11 RX ORDER — HYDROXYZINE HYDROCHLORIDE 25 MG/1
TABLET, FILM COATED ORAL
Refills: 0 | Status: SHIPPED | COMMUNITY
Start: 2019-07-08

## 2019-07-11 RX ORDER — OXYCODONE AND ACETAMINOPHEN 5; 325 MG/1; MG/1
TABLET ORAL
Refills: 0 | Status: SHIPPED | COMMUNITY
Start: 2019-07-05

## 2019-07-11 RX ORDER — TEMAZEPAM 15 MG/1
CAPSULE ORAL
Refills: 0 | Status: SHIPPED | COMMUNITY
Start: 2019-07-05

## 2019-07-11 ASSESSMENT — MIFFLIN-ST. JEOR: SCORE: 1371.12

## 2019-07-12 LAB
ATRIAL RATE - MUSE: 65 BPM
DIASTOLIC BLOOD PRESSURE - MUSE: NORMAL MMHG
INTERPRETATION ECG - MUSE: NORMAL
P AXIS - MUSE: 38 DEGREES
PR INTERVAL - MUSE: 146 MS
QRS DURATION - MUSE: 80 MS
QT - MUSE: 418 MS
QTC - MUSE: 434 MS
R AXIS - MUSE: 51 DEGREES
SYSTOLIC BLOOD PRESSURE - MUSE: NORMAL MMHG
T AXIS - MUSE: 27 DEGREES
VENTRICULAR RATE- MUSE: 65 BPM

## 2019-07-15 ENCOUNTER — COMMUNICATION - HEALTHEAST (OUTPATIENT)
Dept: FAMILY MEDICINE | Facility: CLINIC | Age: 39
End: 2019-07-15

## 2019-07-15 DIAGNOSIS — E78.49 FAMILIAL HYPERLIPIDEMIA, HIGH LDL: ICD-10-CM

## 2019-07-15 RX ORDER — ROSUVASTATIN CALCIUM 40 MG/1
40 TABLET, COATED ORAL AT BEDTIME
Qty: 90 TABLET | Refills: 3 | Status: SHIPPED | OUTPATIENT
Start: 2019-07-15

## 2019-12-16 ENCOUNTER — COMMUNICATION - HEALTHEAST (OUTPATIENT)
Dept: SCHEDULING | Facility: CLINIC | Age: 39
End: 2019-12-16

## 2019-12-29 ENCOUNTER — OFFICE VISIT - HEALTHEAST (OUTPATIENT)
Dept: FAMILY MEDICINE | Facility: CLINIC | Age: 39
End: 2019-12-29

## 2019-12-29 DIAGNOSIS — Z20.828 EXPOSURE TO INFLUENZA: ICD-10-CM

## 2019-12-29 DIAGNOSIS — H81.12 BENIGN PAROXYSMAL POSITIONAL VERTIGO, LEFT: ICD-10-CM

## 2019-12-29 LAB
FLUAV AG SPEC QL IA: NORMAL
FLUBV AG SPEC QL IA: NORMAL

## 2019-12-29 RX ORDER — MECLIZINE HYDROCHLORIDE 25 MG/1
25 TABLET ORAL 3 TIMES DAILY PRN
Qty: 30 TABLET | Refills: 1 | Status: SHIPPED | OUTPATIENT
Start: 2019-12-29

## 2020-09-22 ENCOUNTER — COMMUNICATION - HEALTHEAST (OUTPATIENT)
Dept: FAMILY MEDICINE | Facility: CLINIC | Age: 40
End: 2020-09-22

## 2021-05-28 NOTE — TELEPHONE ENCOUNTER
Patient has a future appointment on May 21, 2019 @ 11:00am with Dr. Day for office visit for cramping. Completing task.

## 2021-05-28 NOTE — TELEPHONE ENCOUNTER
New Appointment Needed  What is the reason for the visit:    Abdominal cramping x 1 week, comes with menstrual cycle  Provider Preference: PCP only  How soon do you need to be seen?: As soon as possible  Waitlist offered?: Yes  Okay to leave a detailed message:  Yes

## 2021-05-28 NOTE — TELEPHONE ENCOUNTER
Describe your symptoms: Abdominal cramping x 1 week, comes with menstrual cycle  Any pain: yes  New/Ongoing: Ongoing  How long have you been having symptoms: 1  week(s)  Have you been seen for this:  unknown  Triage offered?: patient declined  Home remedies tried: unknown  Pharmacy Name and Location: not asked  Okay to leave a detailed message? Yes     Scheduled with Dr. Day on June 24h. Added to wait list and sent Dr. Day an appointment request

## 2021-05-29 NOTE — TELEPHONE ENCOUNTER
" Attempted called pt #2, unable to leave voicemail due to no voicemail. Used  line: Jaime ID# [id number]  \"okay to relay message\"    "

## 2021-05-29 NOTE — TELEPHONE ENCOUNTER
As per Dr. Day's note.  She is waiting for the radiologist to reread this and then can send the results with the new reading to the patient..  I do not see the new reading in the chart.    Please have radiology reread this as the conclusion differs from the main part of the note.  Specifically: Is the endometrium normal or thickened??

## 2021-05-29 NOTE — TELEPHONE ENCOUNTER
Attempted to call #3 [phone] received a message stating the subscriber you request is not in service.   There was no other number listed.   Look at CTC form and called spouse [phone]. Received the same message.   Mychart: declined.    Okay to send out letter?

## 2021-05-29 NOTE — TELEPHONE ENCOUNTER
"Attempted to call patient. Phone number kept on dropping. Will try again later.   Use  line to contact : Terra ID:[id number]  \" Okay to relay message\"    "

## 2021-05-29 NOTE — TELEPHONE ENCOUNTER
"Attempted phone call kept on dropping. Attempted 2 tries to call patient. No other numbers listed.   \" Okay to relay message\"  Use  line to contact : Peter ID: [id number]  "

## 2021-05-29 NOTE — TELEPHONE ENCOUNTER
Notes recorded by Gerardo Gómez CMA on 2019 at 3:15 PM CDT  Called and spoke with pt , Message was given, pt understood, no further questions.  Use  line to contact :Terra ID:[id number]    Patient's partners information: Albin Leyva :1974      ----- Message -----  From: Perri Day MD  Sent: 2019   2:32 PM  To: Perri Day Care Team Pool    Please tell the patient that she has chlamydia infection.  I will send an antibiotic for her, called azithromycin.  Anyone who she has recently had sex with should also get this antibiotic.  They may have the infection, without having any symptoms.  We can call in a prescription for her partner(s) if she gives us the name, date of birth, and pharmacy information for them.    Her vitamin D level is low.  I will call a prescription in for a vitamin D pill that she can take every day.    Her iron level is low.  I will call in a prescription for an iron pill that she can take every day.    Her cholesterol continues to be VERY high.  This is quite dangerous and puts her at high risk for heart attack and stroke.  I would strongly recommend that she start a cholesterol pill.  I will send in a prescription.    The following tests were normal: Kidney, thyroid, liver, blood counts.  She is not pregnant.    I am still waiting on the Pap smear and the final result from the ultrasound.  It looks okay, but there was a typo on the report that I want to clarify before I call her with the final result.

## 2021-05-29 NOTE — TELEPHONE ENCOUNTER
DOD,   Are you able to advise. There is a note on 6/7/2019, however I'm not sure if that is what is needed.

## 2021-05-29 NOTE — TELEPHONE ENCOUNTER
"I'm still waiting on the addendum for her pelvic US. I called radiology today to ask them to review it. The report describes a normal endometrium, but concludes \"abnormally thickened endometrium\". I will give results to patient once correct interpretation has been confirmed.  "

## 2021-05-29 NOTE — TELEPHONE ENCOUNTER
Please call in a prescription for the patient's partner:  Azithromycin 250 mg  Take 4 tablets by mouth once  For expedited partner therapy for chlamydia.  No Refills.

## 2021-05-29 NOTE — TELEPHONE ENCOUNTER
Looks like the addendum was written on 6/7.  It says please note the conclusion should read abnormally thickened endometrium.  Dr. Santamaria will be back on Friday.  May want to wait until she addresses this.

## 2021-05-29 NOTE — TELEPHONE ENCOUNTER
----- Message from Perri Day MD sent at 5/31/2019  4:24 PM CDT -----  Please tell the patient that her Pap smear is still a little bit abnormal.  Due to the abnormal Pap smear, she should have her next Pap smear in 1 year.

## 2021-05-29 NOTE — PROGRESS NOTES
Office Visit  [facility]  Date of Service: 05/21/2019      Milton Leyva is a 39 y.o. female who presents for Abdominal Pain (mostly during periods, incision pain )    RLQ abd pain  No urinary or GI symptoms  Pain occurs with menses - at incision area  Pain started 5/14/19 with onset of period.  It has been hurting with previous cycles as well.  Pain started recently.  Menses monthly. Bleeding normal.  Vaginal discharge: none.    Objective   /74 (Patient Site: Left Arm, Patient Position: Sitting, Cuff Size: Adult Regular)   Pulse 78   Resp 16   Wt 163 lb (73.9 kg)   LMP 05/14/2019   SpO2 98%   BMI 29.81 kg/m     She reports that she has never smoked. She has never used smokeless tobacco.    Gen: Alert, no apparent distress.  Heart: Regular rate and rhythm, no murmurs.  Lungs: Clear to auscultation bilaterally, no increased work of breathing.  Abdomen: Soft, non-tender, non-distended, bowel sounds normal.  Extremities: No clubbing, cyanosis, edema.    Results for orders placed or performed in visit on 05/21/19   Chlamydia trachomatis & Neisseria gonorrhoeae, Amplified Detection   Result Value Ref Range    Chlamydia trachomatis, Amplified Detection Positive (!) Negative    Neisseria gonorrhoeae, Amplified Detection Negative Negative   Wet Prep, Vaginal   Result Value Ref Range    Yeast Result No yeast seen No yeast seen    Trichomonas No Trichomonas seen No Trichomonas seen    Clue Cells, Wet Prep No Clue cells seen No Clue cells seen   Comprehensive Metabolic Panel   Result Value Ref Range    Sodium 140 136 - 145 mmol/L    Potassium 4.1 3.5 - 5.0 mmol/L    Chloride 102 98 - 107 mmol/L    CO2 25 22 - 31 mmol/L    Anion Gap, Calculation 13 5 - 18 mmol/L    Glucose 127 (H) 70 - 125 mg/dL    BUN 8 8 - 22 mg/dL    Creatinine 0.62 0.60 - 1.10 mg/dL    GFR MDRD Af Amer >60 >60 mL/min/1.73m2    GFR MDRD Non Af Amer >60 >60 mL/min/1.73m2    Bilirubin, Total 0.8 0.0 - 1.0  mg/dL    Calcium 10.0 8.5 - 10.5 mg/dL    Protein, Total 8.0 6.0 - 8.0 g/dL    Albumin 4.5 3.5 - 5.0 g/dL    Alkaline Phosphatase 64 45 - 120 U/L    AST 13 0 - 40 U/L    ALT 14 0 - 45 U/L   HM2(CBC w/o Differential)   Result Value Ref Range    WBC 6.2 4.0 - 11.0 thou/uL    RBC 4.62 3.80 - 5.40 mill/uL    Hemoglobin 13.8 12.0 - 16.0 g/dL    Hematocrit 40.9 35.0 - 47.0 %    MCV 88 80 - 100 fL    MCH 29.8 27.0 - 34.0 pg    MCHC 33.7 32.0 - 36.0 g/dL    RDW 11.2 11.0 - 14.5 %    Platelets 267 140 - 440 thou/uL    MPV 7.2 7.0 - 10.0 fL   Lipid Cascade   Result Value Ref Range    Cholesterol 292 (H) <=199 mg/dL    Triglycerides 101 <=149 mg/dL    HDL Cholesterol 68 >=50 mg/dL    LDL Calculated 204 (H) <=129 mg/dL    Patient Fasting > 8hrs? Yes    Microalbumin, Random Urine   Result Value Ref Range    Microalbumin, Random Urine 1.49 0.00 - 1.99 mg/dL    Creatinine, Urine 137.1 mg/dL    Microalbumin/Creatinine Ratio Random Urine 10.9 <=19.9 mg/g   Thyroid Cascade   Result Value Ref Range    TSH 1.35 0.30 - 5.00 uIU/mL   Vitamin D, Total (25-Hydroxy)   Result Value Ref Range    Vitamin D, Total (25-Hydroxy) 15.8 (L) 30.0 - 80.0 ng/mL   Iron and Transferrin Iron Binding Capacity   Result Value Ref Range    Iron 74 42 - 175 ug/dL    Transferrin 375 (H) 212 - 360 mg/dL    Transferrin Saturation, Calculated 16 (L) 20 - 50 %    Transferrin IBC, Calculated 469 313 - 563 ug/dL   Pregnancy (Beta-hCG, Qual), Urine   Result Value Ref Range    Pregnancy Test, Urine Negative Negative   HPV High Risk DNA Cervical   Result Value Ref Range    HPV Source SurePath     HPV16 DNA Negative NEG    HPV18 DNA Negative NEG    Other HR HPV Negative NEG    Final Diagnosis SEE NOTES     Specimen Description Cervical Cells      No results found.    Assessment & Plan     1. Right lower quadrant pain. Differential diagnosis includes: ovarian mass, ovarian torsion, appendicitis, PID, constipation, uti. Labs obtained, as below. Pelvic US  ordered.  2. Planning pregnancy. Discussed importance of folate for NTD prevention.  3. Type 2 diabetes. Labs obtained.      Order Summary                                                      1. Right lower quadrant abdominal pain  Chlamydia trachomatis & Neisseria gonorrhoeae, Amplified Detection    Wet Prep, Vaginal    Ambulatory referral to Obstetrics / Gynecology    CANCELED: US Pelvis With Transvaginal Non OB   2. Type 2 diabetes mellitus with hyperglycemia, without long-term current use of insulin (H)  Comprehensive Metabolic Panel    Microalbumin, Random Urine    Thyroid Cascade    Vitamin D, Total (25-Hydroxy)    Ambulatory referral to Ophthalmology   3. Elevated LDL cholesterol (familial)  Lipid Cascade   4. Abnormal uterine bleeding     5. Pap smear abnormality of cervix with LGSIL  Gynecologic Cytology (PAP Smear)    HPV High Risk DNA Cervical   6. Iron deficiency anemia due to chronic blood loss  HM2(CBC w/o Differential)    Iron and Transferrin Iron Binding Capacity   7. Planning pregnancy  Pregnancy (Beta-hCG, Qual), Urine    folic acid (FOLVITE) 800 MCG tablet      No future appointments.    Completed by: Perri Day M.D., [facility]. 5/26/2019 11:35 AM.  This transcription uses voice recognition software, which may contain typographical errors.

## 2021-05-29 NOTE — TELEPHONE ENCOUNTER
"Called pt, unable to leave voicemail due to no voicemail. Used  line: Honey ID# [id number]  \"okay to relay message\"  "

## 2021-05-29 NOTE — TELEPHONE ENCOUNTER
I called radiology again and spoke with Dr. Govea. Endometrium appears to NOT be abnormally thickened. Please let the patient know that her ultrasound result was normal.    Please schedule follow up appointment.    No future appointments.

## 2021-05-30 VITALS — BODY MASS INDEX: 31.28 KG/M2 | HEIGHT: 62 IN | WEIGHT: 170 LBS

## 2021-05-30 VITALS — BODY MASS INDEX: 31.42 KG/M2 | WEIGHT: 169 LBS

## 2021-05-30 VITALS — WEIGHT: 168 LBS | BODY MASS INDEX: 30.73 KG/M2

## 2021-05-30 NOTE — TELEPHONE ENCOUNTER
Rx sent for rosuvastatin.  Patient is a candidate due to LDL >190.    Cholesterol   Date Value Ref Range Status   07/11/2019 297 (H) <=199 mg/dL Final   05/21/2019 292 (H) <=199 mg/dL Final   05/17/2018 314 (H) <=199 mg/dL Final     LDL Calculated   Date Value Ref Range Status   07/11/2019 206 (H) <=129 mg/dL Final   05/21/2019 204 (H) <=129 mg/dL Final   05/17/2018 221 (H) <=129 mg/dL Final     No components found for: DIRECTLDL   HDL Cholesterol   Date Value Ref Range Status   07/11/2019 57 >=50 mg/dL Final   05/21/2019 68 >=50 mg/dL Final   05/17/2018 62 >=50 mg/dL Final     Triglycerides   Date Value Ref Range Status   07/11/2019 168 (H) <=149 mg/dL Final   05/21/2019 101 <=149 mg/dL Final   05/17/2018 156 (H) <=149 mg/dL Final

## 2021-05-30 NOTE — TELEPHONE ENCOUNTER
----- Message from Gerardo Gómez CMA sent at 7/15/2019  2:05 PM CDT -----  Called and spoke with pt , Message was given, pt understood, no further questions.  Please send patient Cholesterol medication.

## 2021-05-30 NOTE — PROGRESS NOTES
Preoperative Exam    Scheduled Procedure: Cosmetic Rhinoplasty  Surgery Date:  19  Surgery Location: [facility] [phone]    Surgeon:  Jad Huitron M.D[facility]    Assessment/Plan:       Problem List Items Addressed This Visit        High    Type 2 diabetes mellitus with hyperglycemia (H)    Relevant Orders    Glycosylated Hemoglobin A1c (Completed)    Electrocardiogram Perform and Read (Completed)       Medium    Elevated LDL cholesterol (familial)    Relevant Orders    Comprehensive Metabolic Panel (Completed)    Lipid Cascade (Completed)    Electrocardiogram Perform and Read (Completed)    Iron deficiency anemia    Relevant Orders    HM2(CBC w/o Differential) (Completed)      Other Visit Diagnoses     Preoperative examination    -  Primary    Relevant Orders    Electrocardiogram Perform and Read (Completed)    History of chlamydia infection        Relevant Orders    Chlamydia trachomatis & Neisseria gonorrhoeae, Amplified Detection        Surgical Procedure Risk: Low (reported cardiac risk generally < 1%)  Have you had prior anesthesia?: Yes  Have you or any family members had a previous anesthesia reaction:  Yes: vomitting afterwards  Do you or any family members have a history of a clotting or bleeding disorder?: No  Cardiac Risk Assessment: increased risk for major cardiac complications based on  type 2 diabetes, hyperlipidemia (very high LDL).    APPROVAL GIVEN to proceed with proposed procedure, without further diagnostic evaluation    Dandre will need a pregnancy test just prior to procedure, Patient's last menstrual period was 2019.     Dandre has experienced vomiting during , in the past.    Functional Status: Independent  Patient plans to recover at home with family.     Subjective:      Dandre Leyva is a 39 y.o. female who presents for a preoperative consultation.      Dandre is planning cosmetic rhinoplasty. She reports that  the planned procedure is to use bone graft from rib to create a higher nasal bridge.    All other systems reviewed and are negative, other than those listed in the HPI.    Pertinent History  Do you have difficulty breathing or chest pain after walking up a flight of stairs: No  History of obstructive sleep apnea: No  Steroid use in the last 6 months: No  Frequent Aspirin/NSAID use: No  Prior Blood Transfusion: No  Prior Blood Transfusion Reaction: No  If for some reason prior to, during or after the procedure, if it is medically indicated, would you be willing to have a blood transfusion?:  There is no transfusion refusal.    Dandre is NOT currently taking the following medications: cefadroxil, hydrocodone/acetaminophen, oxycodone-acetaminophen, hydroxyzine, temazepam. She picked them up from the pharmacy for use postoperatively, per instructions from surgeon.  Current Outpatient Medications   Medication Sig Dispense Refill     alcohol swabs (ALCOHOL WIPES) PadM Use to check blood sugar once daily. 50 each 6     blood glucose test (CONTOUR NEXT TEST STRIPS) strips Check fasting bg and 1 hour after each meal, 4 times daily. 120 strip 10     generic lancets (MICROLET LANCET) Check bg once daily. 50 each 6     rosuvastatin (CRESTOR) 40 MG tablet Take 1 tablet (40 mg total) by mouth at bedtime. 90 tablet 3     cefadroxil (DURICEF) 500 MG capsule TAKE 2 CAPSULES ON DAY 1, THEN TAKE ONE CAPSULE TWICE DAILY UNTIL GONE  0     HYDROcodone-acetaminophen 5-325 mg per tablet TAKE 1-2 TABLETS BY MOUTH ONCE EVERY 4-6 HOURS AS NEEDED // IB ZAUG NOJ 1-2 LUB, 4-6 TEEV NOJ IB ZAUG YOG MOB  0     hydrOXYzine HCl (ATARAX) 25 MG tablet TAKE 1 CAPSULE ONCE EVERY 4 HOURS // IB ZAUG NOJ 1 LUB, 4 TEEV NOJ IB ZAUG  0     ondansetron (ZOFRAN-ODT) 8 MG disintegrating tablet DISSOLVE 1 TABLET BY MOUTH EVERY 6 HOURS AS NEEDED  0     oxyCODONE-acetaminophen (PERCOCET/ENDOCET) 5-325 mg per tablet TAKE 1-2 TABLETS BY MOUTH ONCE EVERY 4-6 HOURS AS  NEEDED FOR PAIN. MAX 6 PER DAY // IB ZAUG NOJ 1-2 LUB, 4-6 TEEV NOJ IB ZAUG YOG MOB  0     temazepam (RESTORIL) 15 mg capsule TAKE 1 CAPSULE DAILY AT BED TIME AS NEEDED // 1 HNUB NOJ 1 LUB THAUM MUS PW YOG XAV NOJ  0     No current facility-administered medications for this visit.          No Known Allergies  Active Non-Hospital Problems    Diagnosis     Type 2 diabetes mellitus with hyperglycemia (H)     Dx .       Planning pregnancy     Iron deficiency anemia     Abnormal uterine bleeding     Pap smear abnormality of cervix with LGSIL     : Pap-NILM. Recheck pap in 6 months.         Elevated LDL cholesterol (familial)     VERY high LDL.  Dx: .  Needs high intensity statin.   Not on statin due to planning pregnancy.       Obesity (BMI 30.0-34.9)     Fibroids     Noted on OB US       Polycystic ovarian syndrome     Need for hepatitis B vaccination     Not immune 9/1/15.         Past Medical History:   Diagnosis Date     Chlamydia vaginitis/cervicitis 10/31/2016    2016. Treated with negative test of cure 2016.     Fetal macrosomia in pregnancy ,      History of classical  section 2017    VBACx4     Infertility 10/19/2015    [facility], Letrozole protocol. PCOS. H/o chlamydia. NEEDS HSG and semen analysis.  has erectile dysfunction.     Preeclampsia, severe 3/8/2018       Past Surgical History:   Procedure Laterality Date      SECTION N/A 3/8/2018    Procedure:  SECTION;  Surgeon: Nohemi Romero DO;  [place] [facility] [place]  Service:       SECTION       CHOLECYSTECTOMY         Social History     Socioeconomic History     Marital status:      Spouse name: Albin Leyva     Number of children: 6     Years of education: Did some ESL     Highest education level: Never attended school   Occupational History     Occupation: PCA   Social Needs     Financial resource strain: Not on file      "Food insecurity:     Worry: Not on file     Inability: Not on file     Transportation needs:     Medical: Not on file     Non-medical: Not on file   Tobacco Use     Smoking status: Never Smoker     Smokeless tobacco: Never Used   Substance and Sexual Activity     Alcohol use: No     Drug use: No     Sexual activity: Yes     Partners: Male     Birth control/protection: None   Lifestyle     Physical activity:     Days per week: Not on file     Minutes per session: Not on file     Stress: Not on file   Relationships     Social connections:     Talks on phone: Not on file     Gets together: Not on file     Attends Latter day service: Not on file     Active member of club or organization: Not on file     Attends meetings of clubs or organizations: Not on file     Relationship status: Not on file     Intimate partner violence:     Fear of current or ex partner: Not on file     Emotionally abused: Not on file     Physically abused: Not on file     Forced sexual activity: Not on file   Other Topics Concern     Not on file   Social History Narrative     Not on file     Objective:     Vitals:    07/11/19 1320   BP: 100/70   Pulse: 66   Resp: 18   Weight: 164 lb (74.4 kg)   Height: 5' 2.25\" (1.581 m)   LMP: 06/14/2019     Physical Exam:  General Appearance:    Alert, healthy appearing   Head:   Normocephalic, no obvious abnormality   Eyes:    Normal conjunctiva and extraocular movement   Ears:    Normal canals, pinnae, and tympanic membranes   Nose:   No significant rhinorrhea, normal mucosa   Mouth/Throat:   Mucosa moist; dentition normal for age; orophaynx clear   Neck/Thyroid:   Trachea midline, no significant adenopathy, tenderness or mass   Lungs/Chest:     Clear to auscultation bilaterally, no increased work of breathing    Heart/Vascular:    Regular rate and rhythm, no murmur, rub, or gallop    Normal pulses.   Abdomen/GI:   Soft, non-tender, no masses, no organomegaly   Neurologic:     No focal deficits   Mental status: "   Normal   MSK/Extremities:   Extremities normal, atraumatic   Skin/Hair/Nails:   Skin color, texture, turgor normal. No rashes or lesions   Genitalia/:   Normal for age   Lymphatic:   No significant lymphadenopathy or splenomegaly.     Labs:  Physical on 07/11/2019   Component Date Value Ref Range Status     Hemoglobin A1c 07/11/2019 6.6* 3.5 - 6.0 % Final     WBC 07/11/2019 6.7  4.0 - 11.0 thou/uL Final     RBC 07/11/2019 4.37  3.80 - 5.40 mill/uL Final     Hemoglobin 07/11/2019 13.3  12.0 - 16.0 g/dL Final     Hematocrit 07/11/2019 38.9  35.0 - 47.0 % Final     MCV 07/11/2019 89  80 - 100 fL Final     MCH 07/11/2019 30.4  27.0 - 34.0 pg Final     MCHC 07/11/2019 34.1  32.0 - 36.0 g/dL Final     RDW 07/11/2019 11.8  11.0 - 14.5 % Final     Platelets 07/11/2019 259  140 - 440 thou/uL Final     MPV 07/11/2019 6.7* 7.0 - 10.0 fL Final     Sodium 07/11/2019 139  136 - 145 mmol/L Final     Potassium 07/11/2019 4.1  3.5 - 5.0 mmol/L Final     Chloride 07/11/2019 103  98 - 107 mmol/L Final     CO2 07/11/2019 25  22 - 31 mmol/L Final     Anion Gap, Calculation 07/11/2019 11  5 - 18 mmol/L Final     Glucose 07/11/2019 109  70 - 125 mg/dL Final     BUN 07/11/2019 15  8 - 22 mg/dL Final     Creatinine 07/11/2019 0.66  0.60 - 1.10 mg/dL Final     GFR MDRD Af Amer 07/11/2019 >60  >60 mL/min/1.73m2 Final     GFR MDRD Non Af Amer 07/11/2019 >60  >60 mL/min/1.73m2 Final     Bilirubin, Total 07/11/2019 1.0  0.0 - 1.0 mg/dL Final     Calcium 07/11/2019 10.2  8.5 - 10.5 mg/dL Final     Protein, Total 07/11/2019 7.6  6.0 - 8.0 g/dL Final     Albumin 07/11/2019 4.3  3.5 - 5.0 g/dL Final     Alkaline Phosphatase 07/11/2019 56  45 - 120 U/L Final     AST 07/11/2019 12  0 - 40 U/L Final     ALT 07/11/2019 16  0 - 45 U/L Final     Cholesterol 07/11/2019 297* <=199 mg/dL Final     Triglycerides 07/11/2019 168* <=149 mg/dL Final     HDL Cholesterol 07/11/2019 57  >=50 mg/dL Final     LDL Calculated 07/11/2019 206* <=129 mg/dL Final      Patient Fasting > 8hrs? 07/11/2019 No   Final     VENTRICULAR RATE 07/11/2019 65  BPM Final     ATRIAL RATE 07/11/2019 65  BPM Final     P-R INTERVAL 07/11/2019 146  ms Final     QRS DURATION 07/11/2019 80  ms Final     Q-T INTERVAL 07/11/2019 418  ms Final     QTC CALCULATION (BEZET) 07/11/2019 434  ms Final     P Axis 07/11/2019 38  degrees Final     R AXIS 07/11/2019 51  degrees Final     T AXIS 07/11/2019 27  degrees Final     MUSE DIAGNOSIS 07/11/2019    Final                    Value:Normal sinus rhythm  Normal ECG  When compared with ECG of 14-JUL-2018 02:00,  No significant change was found  Confirmed by JACKIE SWIFT, REENA LOC: ([id number]) on 7/12/2019 4:04:10 PM           Immunization History   Administered Date(s) Administered     Hep B, Adult 07/15/2016, 05/17/2018     Hepatitis A: Immune 09/03/2015     Influenza, inj, historic,unspecified 02/20/2010, 10/15/2018     Influenza,live, Nasal Laiv4 12/29/2015     Td, Adult, Absorbed 05/24/2004     Tdap 05/24/2004, 10/26/2015     Varicella: Immune 09/03/2015           Electronically signed by Perri Day MD 07/12/19 1:23 PM

## 2021-05-31 ENCOUNTER — RECORDS - HEALTHEAST (OUTPATIENT)
Dept: ADMINISTRATIVE | Facility: CLINIC | Age: 41
End: 2021-05-31

## 2021-05-31 VITALS — BODY MASS INDEX: 30.55 KG/M2 | WEIGHT: 166 LBS | HEIGHT: 62 IN

## 2021-05-31 VITALS — HEIGHT: 61 IN | BODY MASS INDEX: 31.53 KG/M2 | WEIGHT: 167 LBS

## 2021-05-31 VITALS — BODY MASS INDEX: 30.76 KG/M2 | BODY MASS INDEX: 30.73 KG/M2 | WEIGHT: 168.2 LBS | HEIGHT: 62 IN

## 2021-05-31 VITALS — BODY MASS INDEX: 31.42 KG/M2 | WEIGHT: 169 LBS

## 2021-05-31 VITALS — WEIGHT: 167 LBS | BODY MASS INDEX: 31.04 KG/M2

## 2021-05-31 VITALS — BODY MASS INDEX: 30.73 KG/M2 | WEIGHT: 168 LBS

## 2021-06-01 VITALS — HEIGHT: 62 IN | WEIGHT: 182 LBS | BODY MASS INDEX: 33.49 KG/M2

## 2021-06-01 VITALS — HEIGHT: 62 IN | BODY MASS INDEX: 36.34 KG/M2 | WEIGHT: 197.5 LBS

## 2021-06-01 VITALS — HEIGHT: 61 IN | WEIGHT: 174 LBS | BODY MASS INDEX: 32.85 KG/M2

## 2021-06-01 VITALS — WEIGHT: 180 LBS | BODY MASS INDEX: 32.92 KG/M2

## 2021-06-01 VITALS — BODY MASS INDEX: 32.31 KG/M2 | WEIGHT: 171 LBS

## 2021-06-02 VITALS — BODY MASS INDEX: 30.54 KG/M2 | WEIGHT: 167 LBS

## 2021-06-03 VITALS — BODY MASS INDEX: 30.18 KG/M2 | HEIGHT: 62 IN | WEIGHT: 164 LBS

## 2021-06-03 VITALS — BODY MASS INDEX: 29.81 KG/M2 | WEIGHT: 163 LBS

## 2021-06-04 VITALS
RESPIRATION RATE: 12 BRPM | SYSTOLIC BLOOD PRESSURE: 114 MMHG | BODY MASS INDEX: 30.86 KG/M2 | WEIGHT: 168.7 LBS | TEMPERATURE: 98.4 F | DIASTOLIC BLOOD PRESSURE: 75 MMHG | HEART RATE: 81 BPM | OXYGEN SATURATION: 98 %

## 2021-06-04 NOTE — PROGRESS NOTES
Walk In Care Note                                                        Date of Visit: 12/29/2019     Chief Complaint   Dandre Leyva is a(n) 39 y.o.  female who presents to Walk In [facility] with the following complaint(s):  Dizziness (on and off x 3 days, no fever, no headache or cough)       Assessment and Plan   1. Benign paroxysmal positional vertigo, left  - meclizine (ANTIVERT) 25 mg tablet; Take 1 tablet (25 mg total) by mouth 3 (three) times a day as needed for dizziness or nausea.  Dispense: 30 tablet; Refill: 1  - Ambulatory referral to PT/OT    2. Exposure to influenza  - Influenza A/B Rapid Test- Nasal Swab  - oseltamivir (TAMIFLU) 75 MG capsule; Take 1 capsule (75 mg total) by mouth daily for 10 days.  Dispense: 10 capsule; Refill: 0      Patient presenting with 3-day history of vertigo. Bouton Hallpike testing is positive for symptoms bilaterally, left greater than right, but without nystagmus. Treating with meclizine as listed above. Referring to Physical Therapy / Occupational Therapy for evaluation / treatment of vertigo.   Patient's son was seen in clinic today and diagnosed with Influenza B. Ten-day course of oseltamivir prescribed for patient for chemoprophylaxis due to increased risk of influenza-related complications in the setting of diabetes.     Counseled patient regarding assessment and plan for evaluation and treatment. Questions were answered. See AVS for the specific written instructions and educational handout(s) regarding benign paroxysmal positional vertigo and influenza that were provided at the conclusion of the visit.     Discussed signs / symptoms that warrant urgent / emergent medical attention.     Follow up with Primary Care within 4 days if symptoms persist / worsen.      History of Present Illness   Primary symptom: Dizziness  Description of symptoms: Off balance  Onset: 3 days ago  Progression: Persisting  Frequency: Constant  Exacerbating factors: Moving her head up or down or  turning her head to either side when she is laying down.   Relieving factors: OTC travel sickness medication  Associated falls: No  Associated presyncope / syncope: No  Associated vertigo: Denies  Associated chest pain: No  Associated palpitations: No  Associated shortness of breath: No  Associated diaphoresis: No  Associated nausea: No  Associated facial droop: No  Associated focal weakness: No  Associated paresthesias: No  Additional symptoms: None  History of similar symptoms: Yes, 2-3 weeks ago, lasting 2-3 days.   History of coronary artery disease: No  History of cardiac arrhythmia: No  Additional pertinent history: Reports that her blood sugar levels have been stable, though her glucose was 298 yesterday evening. Son was diagnosed with Influenza B.      Review of Systems   Review of Systems   All other systems reviewed and are negative.       Physical Exam   Vitals:    12/29/19 0951   BP: 114/75   Pulse: 81   Resp: 12   Temp: 98.4  F (36.9  C)   TempSrc: Oral   SpO2: 98%   Weight: 168 lb 11.2 oz (76.5 kg)     Physical Exam  Vitals signs and nursing note reviewed.   Constitutional:       General: She is not in acute distress.     Appearance: She is well-developed and overweight. She is not ill-appearing, toxic-appearing or diaphoretic.   HENT:      Head: Normocephalic and atraumatic.      Right Ear: Tympanic membrane, ear canal and external ear normal.      Left Ear: Tympanic membrane, ear canal and external ear normal.      Nose: No mucosal edema or rhinorrhea.      Mouth/Throat:      Mouth: Mucous membranes are moist. No oral lesions.      Pharynx: Uvula midline. No oropharyngeal exudate or posterior oropharyngeal erythema.   Eyes:      General: Lids are normal.      Extraocular Movements: Extraocular movements intact.      Conjunctiva/sclera: Conjunctivae normal.      Pupils: Pupils are equal, round, and reactive to light.   Neck:      Musculoskeletal: Neck supple. No edema or erythema.   Cardiovascular:       Rate and Rhythm: Normal rate and regular rhythm.      Heart sounds: S1 normal and S2 normal. No murmur. No friction rub. No gallop.    Pulmonary:      Effort: Pulmonary effort is normal.      Breath sounds: Normal breath sounds. No stridor. No wheezing, rhonchi or rales.   Lymphadenopathy:      Cervical: No cervical adenopathy.   Skin:     General: Skin is warm and dry.      Coloration: Skin is not pale.      Findings: No rash.   Neurological:      General: No focal deficit present.      Mental Status: She is alert and oriented to person, place, and time.      Cranial Nerves: Cranial nerves are intact.      Sensory: Sensation is intact.      Motor: Motor function is intact.      Comments: Veronica Hallpike positive for vertigo symptoms bilaterally, left greater than right, but negative for nystagmus.           Diagnostic Studies   Laboratory:  Results for orders placed or performed in visit on 12/29/19   Influenza A/B Rapid Test- Nasal Swab   Result Value Ref Range    Influenza  A, Rapid Antigen No Influenza A antigen detected No Influenza A antigen detected    Influenza B, Rapid Antigen No Influenza B antigen detected No Influenza B antigen detected     Radiology:  N/A  Electrocardiogram:  N/A     Procedure Note   N/A     Pertinent History   The following portions of the patient's history were reviewed and updated as appropriate: allergies, current medications, past family history, past medical history, past social history, past surgical history and problem list.    Patient has Obesity (BMI 30.0-34.9); Type 2 diabetes mellitus with hyperglycemia (H); Elevated LDL cholesterol (familial); Need for hepatitis B vaccination; Polycystic ovarian syndrome; Fibroids; Pap smear abnormality of cervix with LGSIL; Abnormal uterine bleeding; Iron deficiency anemia; and Planning pregnancy on their problem list.    Patient has a past medical history of Chlamydia vaginitis/cervicitis (10/31/2016), Fetal macrosomia in pregnancy (2006,  ), History of classical  section (2017), Infertility (10/19/2015), and Preeclampsia, severe (3/8/2018).    Patient has a past surgical history that includes Cholecystectomy;  section (N/A, 3/8/2018); and  section ().    Patient's family history includes Developmental delay in her son; Diabetes in her father; Hyperlipidemia in her mother; No Medical Problems in her brother, brother, brother, brother, brother, brother, brother, sister, sister, sister, and sister.    Patient reports that she has never smoked. She has never used smokeless tobacco. She reports that she does not drink alcohol or use drugs.     Due to language barrier, a phone  was utilized for the duration of this patient's encounter.     Portions of this note have been dictated using voice recognition software. Any grammatical or contextual distortions are unintentional and inherent to the software.    Som Emmanuel MD  [facility]

## 2021-06-04 NOTE — TELEPHONE ENCOUNTER
Dizzy all weekend.  Look up or looks down at feet gets dizzy    No fainting.    No HA, fever, shortness of breath     No neck pain.    Admits she is not drinking enough water. Mouth not dry.    Gets high Bp and glucose when pregnant.    LMP Dec 12    No chest pain or rapid heart rate.    I asked her if she checked her Bp but she says she wants to come to clinic and get blood pressure checked and blood drawn for tests.    I told her she needs appointment to see provider. No openings. Declines being able to get to Walk In Clinic or other clinic.    Patient asking if she can come to clinic today. Patient asking if note can be sent to pcp and Call patient [phone] when she can be seen.    Susan Mccormick RN Triage and refills

## 2021-06-04 NOTE — TELEPHONE ENCOUNTER
Reason for Disposition    Lightheadedness (dizziness) present now, after 2 hours of rest and fluids    Protocols used: DIZZINESS-A-OH

## 2021-06-04 NOTE — TELEPHONE ENCOUNTER
I'm out of the office this afternoon. Please schedule with a partner, or have her come in at 7:45 am tomorrow (DB at 8 am).

## 2021-06-08 NOTE — PROGRESS NOTES
Assessment: Follow up with Dandre today, brings in meter, reports she has not been checking much, states when she was checking either before or after meals, bg were good.  Has not been taking PNV, states it makes her hungry and the metformin is not always daily.      Current medication:  Metformin 500 mg bid    BG:    Fasting           PC  1.20    123  1.15    138  12.22                        337   Ate rice patties and drank pepsi  12.14  139  11.6    127   10.2    126    Instructed on need for metformin consistently bid to aid in bg control and added benefit for wt loss and fertility.    Continues with two meals per day, generally eats meat with lots of vege, will have rice occasionally but not every day, but has been eating more rice patties and regular pepsi. Instructed to avoid rice patties, eat on occasion and eat long grain rice at meals instead. Instructed to avoid regular pepsi and try pepsi max, tastes like regular pepsi but does not have the cho or kcal, will try this.    Last a1c noted at 6.8 in 10.2017, unable to recheck today given less than 3 months and A1c is at goal.  Instructed to make follow up with , was to follow up in 11.2017, A1c can be checked at this visit.   Plan: Follow up 3 months    Subjective and Objective:      Dandre Leyva is referred by Dr. Dya for Diabetes Education.     Lab Results   Component Value Date    HGBA1C 6.8 (H) 10/27/2016         Current diabetes medications:    Metformin 500 mg bid    Goals       monitoring            1. Check bg once daily, fasting or 2 pc supper.  11.25.15      No PC checks, encouraged to check PC 1-2 times per week. 2.16.16    Check fasting or PC, Lost meter.  6.30.16        monitoring            1. 2-4# wt loss by next visit, 6.2016.    Not met, will continue to work on wt loss with increased activity, less frying of food and smaller portions. 6.30.16    3# wt loss noted, current wt 167#.  MET            Follow up:   CDE (certified diabetic  educator)      Education:     Monitoring   Meter (per above goals): Discussed and Competent  Monitoring: Assessed and Discussed  BG goals: Assessed and Discussed    Nutrition Management  Nutrition Management: Assessed and Discussed  Weight: Assessed  Portions/Balance: Discussed  Carb ID/Count: Discussed  Heart Healthy Fats: Discussed  Menu Planning: Discussed  Physical Activity: Discussed  Medications: Assessed and Literature provided      Acute Complications: Prevent, Detect, Treat:  Hypoglycemia: Assessed  Hyperglycemia: Discussed    Chronic Complications  Eye: Discussed and Competent  ABC: Assessed and Discussed  Teeth:Competent  Goal Setting and Problem Solving: Assessed  Barriers: Assessed  Psychosocial Adjustments: Assessed      Time spent with the patient: 60 minutes for diabetes education and counseling.   Previous Education: yes  Visit Type:DSMT  Hours Remaining: DSMT 7 and MNT 1.25      Elena Paul  1/20/2017

## 2021-06-09 NOTE — PROGRESS NOTES
Subjective    Dandre Leyva is a 37 y.o. female who presents for diabetes follow-up.    The patient is undergoing infertility treatments with [facility].  They told her that she should come back in for reassessment of her diabetes because it has been a while since she has seen us.  She has been following with a diabetes educator.  They recommended that she get a diabetic eye exam.  But she wants had a diabetic eye exam and after they were dilated her eyes, she had watering of her eyes.  So she refuses to see ophthalmology exam because I told her that they would need to dilate her eyes in the future.    The patient reports that she has been using pills and injections for 3 months.  This month, her doctor is on vacation so she is not using medications.  She and her  are still trying naturally.  She was advised to have a hysterosalpingogram but got scared of the hysterosalpingogram because she envisioned large tubes getting pushed into her fallopian tubes.  She knows other people who had hysterosalpingogram's and had abdominal discomfort with them.  She wonders exactly which day she is ovulating.  When I suggested that she have intercourse on multiple days around the time of ovulation, she brought up that her  is on mental health many medications that are injected once monthly (it sounds like it may be court mandated) that caused erectile dysfunction.  His physician gave him a prescription for a medication to take around the time of intercourse.  But it cost $60 per pill and they were unable to afford it.  Discussed Cialis to maybe broaden the window during which they may have intercourse.  Her  to talk to his physician about this.    The patient reports that her blood sugars run high when she eats high carbohydrate foods such as rice, kapone.  She takes her metformin 500 mg pill anywhere between 2-8 times per week (it should be twice daily, 14 pills per week).  This morning, her sugar was 133.   "She ate a high carbohydrate meal yesterday evening.    She does not want to take a prenatal vitamin.  She states that the prenatal vitamin makes her hungry so she does not want to take it.     Objective    Blood pressure 90/70, pulse 66, resp. rate 21, height 5' 1.5\" (1.562 m), weight 170 lb (77.1 kg), last menstrual period 02/01/2017, not currently breastfeeding. Body mass index is 31.6 kg/(m^2). Patient reports that she has never smoked. She does not have any smokeless tobacco history on file.    Gen: Alert, no apparent distress.  Heart: Regular rate and rhythm, no murmurs.  Lungs: Clear to auscultation bilaterally, no increased work of breathing.  Abdomen: Soft, non-tender, non-distended, bowel sounds normal.  Extremities: No clubbing, cyanosis, edema.    Results for orders placed or performed in visit on 02/24/17   Pregnancy, Urine   Result Value Ref Range    Pregnancy Test, Urine Negative Negative    Specific Gravity, UA >1.030 (H) 1.001 - 1.030   Glycosylated Hemoglobin A1c   Result Value Ref Range    Hemoglobin A1c 6.8 (H) 3.5 - 6.0 %   Microalbumin, Random Urine   Result Value Ref Range    Microalbumin, Random Urine 1.29 0.00 - 1.99 mg/dL    Creatinine, Urine 190.9 mg/dL    Microalbumin/Creatinine Ratio Random Urine 6.8 <=19.9 mg/g     No results found.        Assessment & Plan      Dandre is a 37 y.o. female who is here today for Follow-up (Infertility)      1. Type 2 diabetes, well controlled on oral medication.  Noncompliant with metformin.  Declines diabetic eye exam.  Labs obtained as below.  Encourage adherence to diet and medications.  2. Planning pregnancy.  Undergoing infertility treatment.  Declines prenatal vitamin.  Recommended folic acid 800  g daily.  3. Obesity.  BMI 31.6.  Discussed diet and exercise.  4. History of chlamydia.  Recheck urine gonorrhea and chlamydia.    1. Type 2 diabetes mellitus without complication, without long-term current use of insulin  metFORMIN (GLUCOPHAGE) 500 MG " tablet    Chlamydia trachomatis & Neisseria gonorrhoeae, Amplified Detection    Glycosylated Hemoglobin A1c    Comprehensive Metabolic Panel    Vitamin D, Total (25-Hydroxy)    Thyroid Cascade    Microalbumin, Random Urine    Ambulatory referral to Ophthalmology   2. Diabetes mellitus due to underlying condition with hyperglycemia  generic lancets (MICROLET LANCET)    blood glucose test (CONTOUR NEXT STRIPS) strips    alcohol swabs (ALCOHOL WIPES) PadM   3. Infertility  Pregnancy, Urine   4. Planning pregnancy  Pregnancy, Urine    folic acid (FOLVITE) 800 MCG tablet    DISCONTINUED: PRENATAL PLUS, CALCIUM CARB, 27 mg iron- 1 mg Tab           This transcription uses voice recognition software, which may contain typographical errors.

## 2021-06-10 NOTE — PROGRESS NOTES
Assessment: Follow up with Dandre today, comes in with meter, bg noted below.  Has not been checking bg on any consistent basis as noted and has not been taking metformin bid or folic acid as prescribed.      Instructed on the need and importance of bg/A1c control for type 2 dm and type 2 with pregnancy.  Actively pursing pregnancy, with  from [facility], request pregnancy test today, received ok from Padmini GONSALES, order entered.    Instructed on need for metformin 500 mg bid and folic acid daily in addition to her vitamin D, Dandre states her vitamin is too big, not sure if this is the folic acid or if she is taking a PNV, and takes the metformin when she feels she needs it, could not tell me rationale for taking. Dandre was not willing to make commitment to take above medications or check bg on consistent basis despite education and rationale for this.  Instructed on strict bg guidelines during pregnancy and follow up with  Endo if she would become pregnant for dm mgmt.     No change in intake, two meals per day with mainly meat and vege at meals, will eat small amount of rice at some meals. No noodles, bread or potatoes noted. Has stopped the rice paddies and rarely drinks Pepsi.  No wt loss today, remains at 169#. Inquired about exercise, states she is too busy. Instructed on walking for 10 minutes a few time per day to work in some activity.     Plan: Follow up 8.2017    Subjective and Objective:      Dandre Leyva is referred by Dr. Day for Diabetes Education.     Lab Results   Component Value Date    HGBA1C 6.8 (H) 02/24/2017         Current diabetes medications:    Metformin 500 mg bid    Goals       monitoring            1. Check bg once daily, fasting or 2 pc supper.  11.25.15      No PC checks, encouraged to check PC 1-2 times per week. 2.16.16    Check fasting or PC, Lost meter.  6.30.16        monitoring            1. 2-4# wt loss by next visit, 6.2016.    Not met, will continue to work on wt loss with  increased activity, less frying of food and smaller portions. 6.30.16    3# wt loss noted, current wt 167#.  MET            Follow up:   CDE (certified diabetic educator)      Education:     Monitoring   Meter (per above goals): Competent  Monitoring: Assessed and Discussed  BG goals: Assessed and Discussed    Nutrition Management  Nutrition Management: Assessed and Discussed  Weight: Assessed and Discussed  Portions/Balance: Discussed  Carb ID/Count: Literature provided  Heart Healthy Fats: Discussed  Menu Planning: Discussed  Physical Activity: Assessed and Discussed  Medications: Assessed and Discussed    Acute Complications: Prevent, Detect, Treat:  Hypoglycemia: Assessed and Discussed  Hyperglycemia: Assessed and Discussed    Chronic Complications  ABC: Assessed and Discussed  Goal Setting and Problem Solving: Assessed ,discussed  Barriers: Assessed  Psychosocial Adjustments: Assessed      Time spent with the patient: 60 minutes for diabetes education and counseling.   Previous Education: yes  Visit Type:DSMT  Hours Remaining: DSMT 1 and MNT 2      Elena aPul  4/21/2017

## 2021-06-11 NOTE — TELEPHONE ENCOUNTER
Due for physical + Diabetes check. Please schedule.    No future appointments.  Health Maintenance Due   Topic Date Due     PREVENTIVE CARE VISIT  1980     DIABETIC EYE EXAM  1980     PNEUMOCOCCAL IMMUNIZATION 19-64 MEDIUM RISK (1 of 1 - PPSV23) 01/01/2000     HEPATITIS B VACCINES (3 of 3 - Risk 3-dose series) 09/17/2018     DIABETIC FOOT EXAM  11/19/2019     A1C  01/11/2020     PAP SMEAR  05/21/2020     MICROALBUMIN  05/21/2020     HPV TEST  05/21/2020     BMP  07/11/2020     LIPID  07/11/2020     INFLUENZA VACCINE RULE BASED (1) 08/01/2020     BP Readings from Last 3 Encounters:   12/29/19 114/75   11/19/19 114/66   07/11/19 100/70

## 2021-06-12 NOTE — PROGRESS NOTES
First OB visit today.    The patient had been experiencing infertility.  She was seen [facility] for this.  Was doing shots, couldn't afford any more. Has been using Hmong herbs and shaman. Now she's pregnant. Miscarriage was two years ago. Usually takes her 2 years to get pregnant.    Problems addressed today include:    Type 2 diabetes.  She has not been adherent with blood sugar monitoring.  Blood sugars have been high.  She thinks blood sugars have been okay.  She states that she ate pizza + Pepsi and her sugars were >160,  once last week.  Discussed glucosuria today.  Discussed risks of uncontrolled diabetes in first trimester.    History of classical  section.  She has had 4 subsequent VBACs.  The first 4 children were born in [facility].  The last was born in [place].  We do not have records from that delivery, but apparently the baby was more than 10 pounds.    Transfer of care.  Discussed with the patient that this pregnancy is high risk due to type 2 diabetes, history of classic  section, advanced maternal age, obesity with BMI greater than 30, and history of fetal macrosomia with previous delivery.  She accepts transfer of care, but it is important to her that her provider is willing to work with her regarding her preferences.  When I asked her what those preferences are, she states that she wants to minimize cervix checks, both during pregnancy and also during labor.  With her previous pregnancies, her family members, usually her sister-in-law, pushed on her fundus during contractions.  When she was in [place] the doctor and the nurse to help to do this to.  But she has heard that in [place] we do not do this.  Discussed the importance of discussing this with her provider, antepartum, to come up with a mutual plan regarding this interest.    I reviewed the following components of the patient chart today:    Active problems    Past Medical  History    Medications    Allergies    Family History    Social History    Genetic Questionairre    Prenatal Labs    Prenatal Ultrasound  Physical exam performed, see physical exam portion of chart.    Total time 40 minutes, >50% spent in counseling and coordination of care regarding new pregnancy and review of patient's current health status and pregnancy.

## 2021-06-12 NOTE — PROGRESS NOTES
" Milton Leyva is a 37 y.o. female who presents for back pain in early pregnancy.    Patient presents today for follow-up of ER visit, earlier this morning.  She is most concerned because when she had a miscarriage in the past, she had back pain with it.  She very much wants to be pregnant and is concerned about miscarriage.  In the emergency room, her beta hCG level was low and there was no intrauterine pregnancy.  She was instructed to follow-up in clinic.    The patient does have a history of chronic lower back pain.  Her current pain feels similar to her chronic lower back pain.  It is located on her right side, near the flank.  She has not experienced any uterine cramping or bleeding.  No abnormal vaginal discharge.  She has a sure last menstrual period.    The patient has type 2 diabetes.  Last hemoglobin A1c was less than 7%.  She has significantly modified her diet.  She has cut down quite significantly on rice.  She has nearly eliminated pop.  And she drinks no juice.  She eats mostly meat and vegetables.  She is trying to exercise most days.  She did stop exercising recently because of the restrictions her culture places on being near water during pregnancy.  I encouraged her to remain active during her pregnancy, avoiding water, if necessary.  Discussed referral to diabetes educator.  She wonders if she needs to transfer to OB/GYN and endocrinology.  I discussed that that depends on the control of her diabetes.  She would prefer to stay at [facility], if possible.     Objective    Blood pressure 100/58, pulse 78, temperature 98.5  F (36.9  C), temperature source Oral, resp. rate 18, height 5' 1.5\" (1.562 m), weight 166 lb (75.3 kg), last menstrual period 06/23/2017, not currently breastfeeding. Body mass index is 30.86 kg/(m^2). Patient reports that she has never smoked. She does not have any smokeless tobacco history on file.    Gen: Alert, no apparent distress.  Heart: " Regular rate and rhythm, no murmurs.  Lungs: Clear to auscultation bilaterally, no increased work of breathing.  Abdomen: Soft, non-tender, non-distended, bowel sounds normal.  Back: Normal inspection.  No skin changes.  No significant CVA tenderness.  The area of her pain corresponds to the muscles of the right mid/lower back.  Extremities: No clubbing, cyanosis, edema.    Office Visit on 07/24/2017   Component Date Value Ref Range Status     Glucose, UA 07/24/2017 Negative  Negative Final     Ketones, UA 07/24/2017 Trace* Negative Final     Protein, UA 07/24/2017 Negative  Negative mg/dL Final   Admission on 07/24/2017, Discharged on 07/24/2017   Component Date Value Ref Range Status     POC Preg, Urine 07/24/2017 Positive* Negative Final     POCt Kit Lot Number 07/24/2017 [id number]   Final     POCT Kit Expiration Date 07/24/2017 [id number]   Final     Pos Control 07/24/2017 Valid Control  Valid Control Final     Neg Control 07/24/2017 Valid Control  Valid Control Final     Dipstick Lot Number 07/24/2017 [id number]   Final     Dipstick Expiration Date 07/24/2017 2018-03-31   Final     POC Specific Gravity, Urine 07/24/2017 1.015   Final     Color, UA 07/24/2017 Yellow  Colorless, Yellow, Straw, Light Yellow Final     Clarity, UA 07/24/2017 Clear  Clear Final     Glucose, UA 07/24/2017 Negative  Negative Final     Bilirubin, UA 07/24/2017 Negative  Negative Final     Ketones, UA 07/24/2017 Negative  Negative, 60 mg/dL Final     Specific Gravity, UA 07/24/2017 1.013  1.001 - 1.030 Final     Blood, UA 07/24/2017 Negative  Negative Final     pH, UA 07/24/2017 6.5  4.5 - 8.0 Final     Protein, UA 07/24/2017 Negative  Negative mg/dL Final     Urobilinogen, UA 07/24/2017 <2.0 E.U./dL  <2.0 E.U./dL, 2.0 E.U./dL Final     Nitrite, UA 07/24/2017 Negative  Negative Final     Leukocytes, UA 07/24/2017 Negative  Negative Final     WBC 07/24/2017 8.8  4.0 - 11.0 thou/uL Final     RBC 07/24/2017 3.92  3.80 - 5.40 mill/uL Final      Hemoglobin 07/24/2017 11.8* 12.0 - 16.0 g/dL Final     Hematocrit 07/24/2017 34.7* 35.0 - 47.0 % Final     MCV 07/24/2017 89  80 - 100 fL Final     MCH 07/24/2017 30.1  27.0 - 34.0 pg Final     MCHC 07/24/2017 34.0  32.0 - 36.0 g/dL Final     RDW 07/24/2017 12.4  11.0 - 14.5 % Final     Platelets 07/24/2017 201  140 - 440 thou/uL Final     MPV 07/24/2017 8.8  8.5 - 12.5 fL Final     Sodium 07/24/2017 140  136 - 145 mmol/L Final     Potassium 07/24/2017 3.6  3.5 - 5.0 mmol/L Final     Chloride 07/24/2017 109* 98 - 107 mmol/L Final     CO2 07/24/2017 23  22 - 31 mmol/L Final     Anion Gap, Calculation 07/24/2017 8  5 - 18 mmol/L Final     Glucose 07/24/2017 116  70 - 125 mg/dL Final     Calcium 07/24/2017 9.0  8.5 - 10.5 mg/dL Final     BUN 07/24/2017 11  8 - 22 mg/dL Final     Creatinine 07/24/2017 0.62  0.60 - 1.10 mg/dL Final     GFR MDRD Af Amer 07/24/2017 >60  >60 mL/min/1.73m2 Final     GFR MDRD Non Af Amer 07/24/2017 >60  >60 mL/min/1.73m2 Final     Beta-hCG, Quantitative 07/24/2017 273* 0 - 4 mlU/mL Final        Results for orders placed or performed in visit on 07/24/17   Urinalysis, OB Screen   Result Value Ref Range    Glucose, UA Negative Negative    Ketones, UA Trace (!) Negative    Protein, UA Negative Negative mg/dL     No results found.       Assessment & Plan      Dandre is a 37 y.o. female who is here today for Follow-up (back pain ER [place] early stages of pregnancy)      1. Lower back pain in early pregnancy.  Physical exam is consistent with musculoskeletal back pain.  Based on location and nature of pain, this is highly unlikely to be related to her pregnancy.  To err on the side of caution, we have ordered follow-up quantitative hCG level.  2. Type 2 diabetes and pregnancy.  Referral made to diabetes educator.  Okay to continue to hold metformin and work on diet and exercise.  3. Schedule OB intake with [facility].    1. Supervision of normal pregnancy in first trimester   Urinalysis, OB Screen    Beta-hCG, Quantitative    US OB < 14 Weeks    Prenatal Screen [place] and Hemogram    HIV Antigen/Antibody Screening [facility]    Lead, Blood   2. Lower back pain  Beta-hCG, Quantitative    acetaminophen (TYLENOL) 325 MG tablet   3. Normal pregnancy in first trimester  prenat.vits,annalise,min-iron-folic (PRENATAL VITAMIN) Tab    Ambulatory referral to Diabetes Education (Existing Diagnosis)   4. Type 2 diabetes mellitus  Ambulatory referral to Diabetes Education (Existing Diagnosis)    Glycosylated Hemoglobin A1c    Glucose           This transcription uses voice recognition software, which may contain typographical errors.

## 2021-06-12 NOTE — PROGRESS NOTES
Chief Complaint:  Chief Complaint   Patient presents with     Pregnancy Confirmation     pregnancy #7     HPI:   Dandre Leyva is a 37 y.o. female is here for pregnancy intake.  She is .  Patient's last menstrual period was 2017 (exact date).  I saw her yesterday for spotting in early pregnancy.  Pregnancy hormone level has been going up.  She has a follow-up ultrasound already scheduled.  She is still having some very light spotting right now.  She has Type 2 diabetes.  Lab Results   Component Value Date    HGBA1C 6.8 (H) 2017   Blood sugars have been   Morning Fastin, 11, 118  Post-prandial: 130, 140     Past medical history: reviewed and updated.  Past Medical History:   Diagnosis Date     Chlamydia vaginitis/cervicitis 10/31/2016    2016. Treated with negative test of cure 2016.     Diabetes mellitus      Fetal macrosomia in pregnancy     baby 10 lb 4 oz     Infertility 10/19/2015    [facility], Letrozole protocol. PCOS. H/o chlamydia. NEEDS HSG and semen analysis.  has erectile dysfunction.     Past Surgical History:   Procedure Laterality Date      SECTION, CLASSIC      Vertical skin scar     CHOLECYSTECTOMY         Current Outpatient Prescriptions:      acetaminophen (TYLENOL) 325 MG tablet, Take 2 tablets (650 mg total) by mouth every 6 (six) hours as needed for pain., Disp: 100 tablet, Rfl: 2     alcohol swabs (ALCOHOL WIPES) PadM, Use to check blood sugar once daily., Disp: 50 each, Rfl: 6     blood glucose test (CONTOUR NEXT STRIPS) strips, Check bg once daily, rotate between fasting and 2 hours after last meal of day., Disp: 50 strip, Rfl: 6     generic lancets (MICROLET LANCET), Check bg once daily., Disp: 50 each, Rfl: 6     prenatal vit no.112-folate no6 1 mg Chew, Chew 1 tablet daily., Disp: 100 tablet, Rfl: 3     cholecalciferol, vitamin D3, (VITAMIN D3) 5,000 unit Tab, Take 1 tablet (5,000 Units total) by  mouth daily., Disp: 30 tablet, Rfl: 2    Social:  Social History   Substance Use Topics     Smoking status: Never Smoker     Smokeless tobacco: None     Alcohol use No       OBJECTIVE:  No Known Allergies  Vitals:    17 1312   BP: 108/60   Pulse: 60   Resp: 16     Body mass index is 31.04 kg/(m^2).    Vital signs stable as recorded above  General: Patient is alert and oriented x 3, in no apparent distress  Physical Exam deferred to patient's First OB Visit.    Results:  Results for orders placed or performed in visit on 17   Urinalysis, OB Screen   Result Value Ref Range    Glucose, UA Negative Negative    Ketones, UA Negative Negative    Protein, UA Negative Negative mg/dL     Other screening pregnancy lab work is ordered and pending.    Patient scored a 6/30 on [place]  Depression Screen.    Assessment and Plan:  1. Pregnancy Intake Appointment.  Dandre Leyva is 37 y.o. and .  Patient's last menstrual period was 2017 (exact date).  Estimated Date of Delivery: 3/30/18  Screening pregnancy lab work was drawn previously, reviewed today.  Prenatal vitamins prescribed.  Problem list and current medications reviewed and updated.  Follow-up ultrasound already ordered.  Prenatal info packet given.    2. Advanced Maternal Age.  Prenatal genetic testing and counseling was discussed with patient today.  She declines testing and counseling at this time.    3. Type 2 Diabetes.  Lab Results   Component Value Date    HGBA1C 6.8 (H) 2017   Continue present management for now.  I will review this with PCP.      Follow up in 4 weeks.  Please see OB Episode for complete details.  Visit was approximately 40 minutes, greater than 50% of time spent in face-to-face counseling and coordination of care.    This dictation uses voice recognition software, which may contain typographical errors.

## 2021-06-12 NOTE — PROGRESS NOTES
[facility] GDM Care Plan    Assessment and BG: Met with Dandre today, last seen 4.2017 for dm mgmt, pregnancy planning, in today pregnant EDC 3.30.18. Type 2 dm noted, had been on metformin no longer taking, please see 4.2017 consult for further detail.      Instructed on basics of dm mgmt in pregnancy, smbg X4, fasting and 1 PC, bg goals of 95 mg/dl fasting and 140 mg/dl 1 PC and the importance of bg mgmt in regard to risk and complications for both Mom and baby. Instructed on basics of GDM meal plan, three meals and three snacks, with portion control of cho foods.     Recall indicates two to three meals per day, usual Asian meal of rice with boiled meat and vege, has but back on rice portion < 1 cup but has been eating more fruit, especially leche and watermelon. Instructed affect fruit and rice have on bg and need for portion control, especially fruit that does not come portion controlled. Meal plan devised with small bowl of rice with meat and vege with fruit for snacks, does not eat yogurt or drink milk, will check with MD regarding calcium, tums EX or Ca supplement.     Dandre has not been checking bg on any consistent basis, last bg check was 8.6.17, @9 PM  166 mg/dl, could not tell me if that was before or after eating.  Expressed concern with pain and multiple bg checks, instructed on importance and rationale of bg checks, especially at 7 weeks gestation.  Does not use lancing device pokes finger with lancet and lances near nail bed. Instructed on use of lancing device and where to jackie, will think about this.  DId admit using device was less pain, bg at visit 115 mg/dl 1 PC. Worried about fasting bg ,states it is never < 95 mg/dl, instructed to check as indicated above, follow meal plan and portion control and we will see how bg respond.  Discussed possible insulin use during pregnancy to manage bg, this may need to be considered, will follow bg and initiate as bg indicate. Recommend referral to pregnancy clinic  given history of type 2 dm, will discuss with Dr. Day.     Will contact Endo scheduling regarding need for 1 week follow up as this writer is out of office next week, appt noted on 8.23.17 with me.       Time: 60 min  Visit Type: GDM Group  Visit #: 1  Provider: Shay  Provider's Diagnosis (per referral form): Gestational (648.83)  Weight:    OGTT: No results found for this or any previous visit.  Estimated Date of Delivery: 3/30/18   Pregnancy #: 7  Previous GDM: unsure  Medications:   Current Outpatient Prescriptions:      alcohol swabs (ALCOHOL WIPES) PadM, Use to check blood sugar once daily., Disp: 50 each, Rfl: 6     blood glucose test (CONTOUR NEXT STRIPS) strips, Check bg once daily, rotate between fasting and 2 hours after last meal of day., Disp: 50 strip, Rfl: 6     cholecalciferol, vitamin D3, (VITAMIN D3) 5,000 unit Tab, Take 1 tablet (5,000 Units total) by mouth daily., Disp: 30 tablet, Rfl: 2     generic lancets (MICROLET LANCET), Check bg once daily., Disp: 50 each, Rfl: 6     prenatal vit no.112-folate no6 1 mg Chew, Chew 1 tablet daily., Disp: 100 tablet, Rfl: 3  PNV: Yes  Hospital:   Meter: contour next EZ    BG monitoring goals: Fasting <95; 1 hour post start of meal <140. Test 4 x per day  GDM meal pattern/carb counting taught per guidelines: Yes    Goals: Nutrition: GDM meal plan  Activity:  Walking after meals when able/staying active  Monitoring:  BG 4x/day as directed, ketones every morning    DIABETES CARE PLAN AND EDUCATION RECORD    Gestational Diabetes Disease Process/Preconception Care/Management During Pregnancy/Postpartum:    Meter (per above goals): Discussed, Literature provided and Patient returned demonstration    Nutrition Management    Weight: Assessed, Discussed and Literature provided  Portions/Balance: Assessed, Discussed and Literature provided  Carb ID/Count: Assessed, Discussed and Literature provided  Label Reading: Assessed, Discussed and Literature provided  Menu  Planning: Assessed, Discussed and Literature provided  Dining Out: Assessed, Discussed and Literature provided  Physical Activity: Discussed and Literature provided    Acute Complications: Prevent, Detect, Treat:    Goal Setting and Problem Solving: Assessed and Discussed  Barriers: Assessed and Discussed  Psychosocial Adjustments: Assessed and Discussed

## 2021-06-12 NOTE — PROGRESS NOTES
Absolutely agree with referral to pregnancy clinic.  I will only care for her pregnancy if she has EXCELLENT compliance and blood sugars. Otherwise, she will need to transfer to OB due to high risk pregnancy.

## 2021-06-12 NOTE — PROGRESS NOTES
Subjective:    Dandre Leyva is a 37 y.o. female who presents for threatened miscarriage.  She was seen at the ER and in our clinic on 7/24/2017 for low back and pelvic pain.  That has resolved.  She is in early pregnancy.  Due date 3/30/2018.  Pregnancy hormone level on 7/24/2017 was 273.  Increased to 815 on 7/26/17.  Ultrasound on 7/24/2017 did not show an intrauterine or extrauterine pregnancy.  A1c checked on 7/26/2017 was 6.8%.  She is here today because this morning she had a little bit of blood with wiping after urinating.  This afternoon she notes a little bit of blood in the toilet when she was urinating.  She is blood type B+.    Patient Active Problem List   Diagnosis     Obesity (BMI 30.0-34.9)     Type 2 diabetes mellitus     Hyperlipidemia     Need for hepatitis B vaccination     Polycystic ovarian syndrome     Normal pregnancy in first trimester       Current Outpatient Prescriptions:      alcohol swabs (ALCOHOL WIPES) PadM, Use to check blood sugar once daily., Disp: 50 each, Rfl: 6     blood glucose test (CONTOUR NEXT STRIPS) strips, Check bg once daily, rotate between fasting and 2 hours after last meal of day., Disp: 50 strip, Rfl: 6     generic lancets (MICROLET LANCET), Check bg once daily., Disp: 50 each, Rfl: 6     acetaminophen (TYLENOL) 325 MG tablet, Take 2 tablets (650 mg total) by mouth every 6 (six) hours as needed for pain., Disp: 100 tablet, Rfl: 2     cholecalciferol, vitamin D3, (VITAMIN D3) 5,000 unit Tab, Take 1 tablet (5,000 Units total) by mouth daily., Disp: 30 tablet, Rfl: 2     prenatal vit no.112-folate no6 1 mg Chew, Chew 1 tablet daily., Disp: 100 tablet, Rfl: 3    Objective:   Allergies:  Review of patient's allergies indicates no known allergies.    Vitals:    08/01/17 1532   BP: 118/60   Pulse: 80   Resp: 20     Body mass index is 31.42 kg/(m^2).    General: Alert and oriented x 3, in no apparent distress  Abdomen: Non tender to palpation, no hepatosplenomegaly, negative  Baltazar's sign, no pain over McBurney's point, positive bowel sounds, no masses palpable    Results for orders placed or performed in visit on 08/01/17   Pregnancy (Beta-hCG, Qual), Urine   Result Value Ref Range    Pregnancy Test, Urine Positive (!) Negative   Urinalysis   Result Value Ref Range    Color, UA Yellow Colorless, Yellow, Straw, Light Yellow    Clarity, UA Clear Clear    Glucose, UA Negative Negative    Bilirubin, UA Negative Negative    Ketones, UA Trace (!) Negative    Specific Gravity, UA 1.025 1.005 - 1.030    Blood, UA Trace (!) Negative    pH, UA 6.5 5.0 - 8.0    Protein, UA Negative Negative mg/dL    Urobilinogen, UA 0.2 E.U./dL 0.2 E.U./dL, 1.0 E.U./dL    Nitrite, UA Negative Negative    Leukocytes, UA Negative Negative   Other labs pending.       Assessment and Plan:   1.  Hematuria.  Differential includes UTI, normal spotting during pregnancy, or threatened miscarriage.  I will follow-up with urine culture tomorrow.  Beta-hCG quantitative was ordered today.  Will contact patient with results when available.  She has had one miscarriage in the past.  I reviewed general guidelines for miscarriages.  We will follow-up tomorrow more.  If she has any problems before then, she knows to go to the ER or contact clinic.    This dictation uses voice recognition software, which may contain typographical errors.

## 2021-06-12 NOTE — PROGRESS NOTES
Pt was not seen today.  was not present. Pt was roomed then declined to be seen due to no . She will f/u with Elena as scheduled next week.

## 2021-06-13 NOTE — PROGRESS NOTES
"[facility] GDM Care Plan      Assessment/Plan:   FB, 103, 102, 85  ppB-  ppL-   ppD-100, 84    Ketones: has not tested.    Dandre arrived today with , [place] and BG meter and log.  Pt has limited data from the past week as indicated above.  She stated she has been busy and not able to test more often.  Reviewed BG testing and goals.    She admits she has not started insulin.  Strongly recommended pt start 10 Units of NPH insulin as prescribed by Virgie Delatorre on 10/26/17.  Pt stated she and her  decided to wait until appt with OB to determine if the baby is growing \"too big.\"  Educated pt on preventing macrosomia and starting insulin.  Reviewed proper mixing,  priming and  injection technique. Discussed sx and tx of hypoglycemia and written instructions provided.     Pt reiterated several times she would not start insulin.  She has reduced the amount of juice, rice noodles and bread.  provided pictorial placemat and educated pt on GDM meal plan.  She is not including snacks and insists that she is testing postprandial BG.  Upon further discussion, she has been eating mostly protein and veg.  She has not tested ketones.  Sent prescription to start testing ketones and educated her on testing and goals.  Weight has been stable.  Reviewed weight gain goals.      Time: 30  Visit Type: pregnancy clinic   Provider: VERNON Daigle   Provider's Diagnosis (per referral form): T2 DM in pregnancy   Lab Results   Component Value Date    HGBA1C 6.0 10/26/2017     Lab Results   Component Value Date    HGBA1C 6.8 (H) 2017   Estimated Date of Delivery: 3/30/18   Pregnancy #: 7  Previous GDM: unknown   Medications:   Current Outpatient Prescriptions:      alcohol swabs (ALCOHOL WIPES) PadM, Use to check blood sugar once daily., Disp: 50 each, Rfl: 6     blood glucose test (CONTOUR NEXT STRIPS) strips, Check fasting bg and 1 hour after each meal, 4 times daily., Disp: 120 strip, Rfl: 10     cholecalciferol, vitamin " D3, (VITAMIN D3) 5,000 unit Tab, Take 1 tablet (5,000 Units total) by mouth daily., Disp: 30 tablet, Rfl: 2     cyclobenzaprine (FLEXERIL) 10 MG tablet, Take 1 tablet (10 mg total) by mouth 2 (two) times a day as needed for muscle spasms., Disp: 20 tablet, Rfl: 0     generic lancets (MICROLET LANCET), Check bg once daily., Disp: 50 each, Rfl: 6     prenatal vit no.112-folate no6 1 mg Chew, Chew 1 tablet daily., Disp: 100 tablet, Rfl: 3     sodium chloride 0.65 % Drop, Use two sprays in each nostril Q2 hours PRN congestion, Disp: 30 mL, Rfl: 1      BG monitoring goals: Fasting <95; 1 hour post start of meal <140. Test 4 x per day.  Check fasting a.m. ketones: No  GDM meal pattern/carb counting taught per guidelines: Yes    Past goals;  Nutrition: GDM meal plan SOMETIMES  Activity: Walking after meals when able/staying active DID NOT ASSESS  Monitoring: BG 4x/day as directed, ketones every morning OCCASIONALLY    New goals:  Start 10 Units of NPH insulin [facility]    DIABETES CARE PLAN AND EDUCATION RECORD    Gestational Diabetes Disease Process/Preconception Care/Management During Pregnancy/Postpartum:Discussed    Meter (per above goals): Assessed and Discussed    Nutrition Management    Weight: Assessed and Discussed  Portions/Balance: Assessed and Discussed  Carb ID/Count: Assessed and Discussed  Label Reading: Assessed and Discussed  Menu Planning: Assessed and Discussed  Dining Out: Assessed and Discussed  Physical Activity: Assessed and Discussed    Acute Complications: Prevent, Detect, Treat:    Goal Setting and Problem Solving: Assessed and Discussed  Barriers: Assessed and Discussed  Psychosocial Adjustments: Assessed and Discussed      Kimmy Tatum RD, LD, CDE   11/1/2017  11:36 AM      I agree with the aforementioned diabetes plan.  Vanna GORE [facility]  11/1/2017  1:21 PM

## 2021-06-13 NOTE — PROGRESS NOTES
[facility] GDM Care Plan      Assessment/Plan: pt seen today for initial visit. She was last seen by CDE in July. Today pt comes in with meter, she checks sporadically, most readings are recent as she knew she was coming in today. However these are ALL her readings since her last appt in July.   FB, 113, 124, 131  Pp: 114, 116, 129, 156, 100, 128, 115, 109, 124, 100, 161, 103, 94, 129, 101, 111, 97, 180, 129, 107, 102, 158, 106, 107  Pt notes pp elevations with pepsi. She typically eats 1-2 meals/day. She does not want to eat 3/day.   Started on 10 units of NPH at HS. Pt feels reluctant. After discussing she agrees. She was able to demo pen w/o difficulty. We did not discuss titration instructions as I felt this would be too much today.   There is some struggle w/ scheduling f/u. She will f/u next week with Kimmy as this is the only date/time she can come. She will then f/u the following week with me again as Hafsa is not in preg clinic that morning of the .       Time: 30  Visit Type: pregnancy clinic   Provider: VERNON Daigle   Provider's Diagnosis (per referral form): T2 DM in pregnancy   Lab Results   Component Value Date    HGBA1C 6.0 10/26/2017     Lab Results   Component Value Date    HGBA1C 6.8 (H) 2017   Estimated Date of Delivery: 3/30/18   Pregnancy #: 7  Previous GDM: unknown   Medications:   Current Outpatient Prescriptions:      alcohol swabs (ALCOHOL WIPES) PadM, Use to check blood sugar once daily., Disp: 50 each, Rfl: 6     blood glucose test (CONTOUR NEXT STRIPS) strips, Check fasting bg and 1 hour after each meal, 4 times daily., Disp: 120 strip, Rfl: 10     cholecalciferol, vitamin D3, (VITAMIN D3) 5,000 unit Tab, Take 1 tablet (5,000 Units total) by mouth daily., Disp: 30 tablet, Rfl: 2     cyclobenzaprine (FLEXERIL) 10 MG tablet, Take 1 tablet (10 mg total) by mouth 2 (two) times a day as needed for muscle spasms., Disp: 20 tablet, Rfl: 0     generic lancets (MICROLET LANCET), Check bg  once daily., Disp: 50 each, Rfl: 6     prenatal vit no.112-folate no6 1 mg Chew, Chew 1 tablet daily., Disp: 100 tablet, Rfl: 3     sodium chloride 0.65 % Drop, Use two sprays in each nostril Q2 hours PRN congestion, Disp: 30 mL, Rfl: 1      BG monitoring goals: Fasting <95; 1 hour post start of meal <140. Test 4 x per day.  Check fasting a.m. ketones: No  GDM meal pattern/carb counting taught per guidelines: Yes    Past Goals:  Nutrition: GDM meal plan NOT MET   Activity: Walking after meals when able/staying active SOMETIMES MET   Monitoring: BG 4x/day as directed, ketones every morning SOMETIMES MET       New Goals:  Nutrition: GDM meal plan   Activity: Walking after meals when able/staying active   Monitoring: BG 4x/day as directed, ketones every morning    DIABETES CARE PLAN AND EDUCATION RECORD    Gestational Diabetes Disease Process/Preconception Care/Management During Pregnancy/Postpartum:Discussed    Meter (per above goals): Assessed and Discussed    Nutrition Management    Weight: Assessed and Discussed  Portions/Balance: Assessed and Discussed  Carb ID/Count: Assessed and Discussed  Label Reading: Assessed and Discussed  Menu Planning: Assessed and Discussed  Dining Out: Assessed and Discussed  Physical Activity: Assessed and Discussed    Acute Complications: Prevent, Detect, Treat:    Goal Setting and Problem Solving: Assessed and Discussed  Barriers: Assessed and Discussed  Psychosocial Adjustments: Assessed and Discussed

## 2021-06-13 NOTE — PROGRESS NOTES
" Milton Leyva is a 37 y.o. female who presents for follow-up of recent Pap smear.    The patient has a history of abnormal Pap smear on 2017.  She had no prior abnormal Pap smears.  The Pap smear 2017 shows LGSIL with HPV negative.  She is currently pregnant, was in first trimester at the time of this Pap smear.  No known history of sexually transmitted infections.  No other risk factors for cervical cancer.    The patient is currently pregnant.  Pregnancy is high risk due to uncontrolled type 2 diabetes in first trimester, history of classical  section.  She was instructed to transfer her care to [facility], but states that no one has called her to schedule that appointment.  Referral was made.       Objective    Blood pressure 98/70, pulse 80, resp. rate 17, height 5' 1.25\" (1.556 m), weight 167 lb (75.8 kg), last menstrual period 2017, not currently breastfeeding. Body mass index is 31.3 kg/(m^2). Patient reports that she has never smoked. She does not have any smokeless tobacco history on file.    Gen: Alert, no apparent distress.  Heart: Regular rate and rhythm, no murmurs.  Lungs: Clear to auscultation bilaterally, no increased work of breathing.  Abdomen: Soft, non-tender, non-distended, bowel sounds normal.  Extremities: No clubbing, cyanosis, edema.    Results for orders placed or performed in visit on 17   Chlamydia/gonorrhoeae CERVIX   Result Value Ref Range    Chlamydia trachomatis, Amplified Detection Negative Negative    Neisseria gonorrhoeae, Amplified Detection Negative Negative   Wet Prep, Vaginal   Result Value Ref Range    Yeast Result No yeast seen No yeast seen    Trichomonas No Trichomonas seen No Trichomonas seen    Clue Cells, Wet Prep No Clue cells seen No Clue cells seen   Urinalysis, OB Screen   Result Value Ref Range    Glucose,  mg/dL (!) Negative    Ketones, UA 15 mg/dL (!) Negative    Protein, UA Negative Negative mg/dL   Gynecologic " Cytology (PAP Smear)   Result Value Ref Range    Case Report       Gynecologic Cytology Report                       Case: [id number]                                   Authorizing Provider:  Perri Day MD         Collected:           08/31/2017 7673              Ordering Location:     [facility]  Received:            08/31/2017 1655                                     Medicine/OB                                                                  First Screen:          KAMRAN Tao                                                                            (ASCP)                                                                       Pathologist:           Mahnaz Velez MD                                                          Specimen:    SUREPATH PAP, SCREENING, Endocervical/cervical                                             Interpretation LSIL encompassing HPV/mild dysplasia/CIN1     Result Flag Abnormal (!) Normal    Specimen Adequacy       Satisfactory for evaluation, endocervical/transformation zone component present    HPV Reflex? Yes regardless of result     HIGH RISK No     LMP/Menopause Date 06/23/17     Abnormal Bleeding No     Pt Status pregnant     Birth Control/Hormones None     Previous Normal/Date 01/16/13     Prev Abn Date/Dx none     Cervical Appearance normal     Interpreting Lab       All Pap smear slide preparation and cytotechnologist screening for [facility] is performed at [facility], [address] [place] [address], [id number]. Final interpretation of this case was done at [facility], [address] [place] [address], [id number].   HPV Cascade (PCR)   Result Value Ref Range    Interpretation No HPV Type(s) Detected No HPV Type(s) Detected, No High Risk HPV Type(s) Detected, DNA Quantity Not Sufficient     Dave Blake MD, [facility]      No results found.       Assessment & Plan      Dandre is a 37 y.o. female who is here today  for Follow-up (pap)      1. LGSIL Pap smear.  Referral to [facility] for colposcopy.  Abnormal Pap smear discussed in depth with patient today.  2. High risk pregnancy due to uncontrolled type 2 diabetes in first trimester and classical  in the past.  Referral to [facility].  She was meeting with our schedulers to make that appointment to schedule care.    1. Pap smear abnormality of cervix with LGSIL     2. High-risk pregnancy, first trimester             This transcription uses voice recognition software, which may contain typographical errors.

## 2021-06-15 PROBLEM — E28.2 POLYCYSTIC OVARIAN SYNDROME: Status: ACTIVE | Noted: 2017-02-27

## 2021-06-15 NOTE — PROGRESS NOTES
"[facility] GDM Care Plan      Assessment: most recent first for the last two months  FB, 100, 95, 106, 112, 112, 113  ppB-  ppL- 113,  120, 110,   ppD-104, 82, 178    Ketones: has not tested.    Dandre arrived today with her BG meter and very limited data from the past two months as indicated above.  Pt was last seen almost three months ago by CDE and at that time was advised to start with 10 Units of NPH insulin QHS.  She was also instructed to go to the pregnancy clinic.  Pt stated she has been \"very busy with school.\"  Reviewed the concerns for the baby and avoiding macrosomia and infant hypoglycemia.  Pt could not recall the number of times she injected NPH insulin nor when she last injected insulin.  She did not bring the insulin with her today and could not recall the amount of insulin left.    After consult with Hafsa Giles CNP instructed pt to start with 12 Units of NPH tonight and sent in prescription of NPH to pharmacy.  Pt argued about the need for 12 Units of NPH and discussed the prescription dose change.  Pt scheduled for pregnancy clinic tomorrow at 11:00 Am per Hafsa Giles CNP.  Pt stated she would come to appt.  Strongly advised pt to test BG as per GDM program.    She has not tested ketones.  She stated she is eating 5x/day and is active.  Encouraged her to continue.      Time: 30  Visit Type: individual   Provider:Dr Coates per pt  Provider's Diagnosis (per referral form): T2 DM in pregnancy   Lab Results   Component Value Date    HGBA1C 6.0 10/26/2017     Lab Results   Component Value Date    HGBA1C 6.8 (H) 2017   Estimated Date of Delivery: 3/30/18   Pregnancy #: 7, miscarriage 1   Previous GDM: unknown   Medications:   Current Outpatient Prescriptions:      alcohol swabs (ALCOHOL WIPES) PadM, Use to check blood sugar once daily., Disp: 50 each, Rfl: 6     blood glucose test (CONTOUR NEXT STRIPS) strips, Check fasting bg and 1 hour after each meal, 4 times daily., Disp: 120 strip, Rfl: 10    "  cholecalciferol, vitamin D3, (VITAMIN D3) 5,000 unit Tab, Take 1 tablet (5,000 Units total) by mouth daily., Disp: 30 tablet, Rfl: 2     cyclobenzaprine (FLEXERIL) 10 MG tablet, Take 1 tablet (10 mg total) by mouth 2 (two) times a day as needed for muscle spasms., Disp: 20 tablet, Rfl: 0     generic lancets (MICROLET LANCET), Check bg once daily., Disp: 50 each, Rfl: 6     prenatal vit no.112-folate no6 1 mg Chew, Chew 1 tablet daily., Disp: 100 tablet, Rfl: 3     sodium chloride 0.65 % Drop, Use two sprays in each nostril Q2 hours PRN congestion, Disp: 30 mL, Rfl: 1      BG monitoring goals: Fasting <95; 1 hour post start of meal <140. Test 4 x per day.  Check fasting a.m. ketones: No  GDM meal pattern/carb counting taught per guidelines: Yes    Past goals;  Nutrition: GDM meal plan SOMETIMES  Activity: Walking after meals when able/staying active SOMETIMES  Monitoring: BG 4x/day as directed, ketones every morning OCCASIONALLY    Past  goals:  Start 10 Units of NPH insulin QHS  NOT DOING    New goal:  Start with 12 Units of NPH QHS    DIABETES CARE PLAN AND EDUCATION RECORD    Gestational Diabetes Disease Process/Preconception Care/Management During Pregnancy/Postpartum:Discussed    Meter (per above goals): Assessed and Discussed    Nutrition Management    Weight: Assessed and Discussed  Portions/Balance: Assessed and Discussed  Carb ID/Count: Assessed and Discussed  Label Reading: Assessed and Discussed  Menu Planning: Assessed and Discussed  Dining Out: Assessed and Discussed  Physical Activity: Assessed and Discussed    Acute Complications: Prevent, Detect, Treat:    Goal Setting and Problem Solving: Assessed and Discussed  Barriers: Assessed and Discussed  Psychosocial Adjustments: Assessed and Discussed      Kimmy Tatum RD, LD, CDE   1/31/2018  11:36 AM    I agree with the aforementioned diabetes plan.  Vanna GORE [facility]  1/31/2018  12:15 PM

## 2021-06-15 NOTE — PROGRESS NOTES
"[facility] GDM Care Plan      Assessment/Plan: pt seen today for f/u. She was just seen yesterday by Kimmy as well after not being seen for a couple months.   Pt is testing not often. Again instructed her to check FBG and 1 hour pp. She agrees to do this. She is due for A1c today but tells me she is not feeling well today so she won't do it. Informed pt will put order in and she can stop at the lab when it is convenient for her.   Pt did not  Rx for insulin that was sent at her appt yesterday. Pt was given NPH sample today by Hafsa and instructed to start at 12 units at HS. Reviewed insulin administration. Reviewed signs and symptoms of hypoglycemia and treatment.   Pt will f/u again next week as scheduled on 2/8/18.         Time: 30  Visit Type: pregnancy clinic   Provider: Dr. Coates   Provider's Diagnosis (per referral form): T2 DM in pregnancy   Lab Results   Component Value Date    HGBA1C 6.0 10/26/2017     Estimated Date of Delivery: 3/30/18   Pregnancy #: 7  Previous GDM: unknown   Medications:   Current Outpatient Prescriptions:      acetone, urine, test Strp, Test each morning, Disp: 30 strip, Rfl: 6     alcohol swabs (ALCOHOL WIPES) PadM, Use to check blood sugar once daily., Disp: 50 each, Rfl: 6     blood glucose test (CONTOUR NEXT STRIPS) strips, Check fasting bg and 1 hour after each meal, 4 times daily., Disp: 120 strip, Rfl: 10     generic lancets (MICROLET LANCET), Check bg once daily., Disp: 50 each, Rfl: 6     HUMULIN N KWIKPEN 100 unit/mL (3 mL) pen, Inject 12 Units under the skin at bedtime. (Patient taking differently: Inject 10 Units under the skin at bedtime. ), Disp: 15 mL, Rfl: 1     lidocaine (XYLOCAINE) 2 % jelly, , Disp: , Rfl: 0     MAPAP EXTRA STRENGTH 500 mg tablet, , Disp: , Rfl: 1     pen needle, diabetic (BD ULTRA-FINE PABLO PEN NEEDLES) 32 gauge x 5/32\" Ndle, Use 1 each As Directed daily., Disp: 100 each, Rfl: 3     prenatal vit no.112-folate no6 1 mg Chew, Chew 1 tablet daily., " Disp: 100 tablet, Rfl: 3      BG monitoring goals: Fasting <95; 1 hour post start of meal <140. Test 4 x per day.  Check fasting a.m. ketones: No  GDM meal pattern/carb counting taught per guidelines: Yes    Past Goals:  Nutrition: GDM meal plan MOSTLY MET   Activity: Walking after meals when able/staying active MOSTLY MET   Monitoring: BG 4x/day as directed, ketones every morning SOMETIMES MET      New Goals:  Nutrition: GDM meal plan   Activity: Walking after meals when able/staying active   Monitoring: BG 4x/day as directed, ketones every morning    DIABETES CARE PLAN AND EDUCATION RECORD    Gestational Diabetes Disease Process/Preconception Care/Management During Pregnancy/Postpartum:Discussed    Meter (per above goals): Assessed and Discussed    Nutrition Management    Weight: Assessed and Discussed  Portions/Balance: Assessed and Discussed  Carb ID/Count: Assessed and Discussed  Label Reading: Assessed and Discussed  Menu Planning: Assessed and Discussed  Dining Out: Assessed and Discussed  Physical Activity: Assessed and Discussed    Acute Complications: Prevent, Detect, Treat:    Goal Setting and Problem Solving: Assessed and Discussed  Barriers: Assessed and Discussed  Psychosocial Adjustments: Assessed and Discussed    I agree with the aforementioned diabetes plan.  Vanna GORE [facility]  2/1/2018  11:47 AM

## 2021-06-15 NOTE — PROGRESS NOTES
[facility]  ENDOCRINOLOGY    Gestational Diabetes 2018    Dandre Leyva, 1980, [id number]          Reason for visit      1. Type 2 diabetes mellitus with hyperglycemia    2. 30 weeks gestation of pregnancy        HPI     Dandre Leyva is a very pleasant 38 y.o. old female who presents for management of Diabetes Mellitus during pregnancy.  She is currently 30 weeks pregnant . Due date is 3/30/18   She hashad  GDM in prior pregnancies.   Current carbohydrate intake:consistent with recommendations of 30g-60g-60g.  I have reviewed her blood glucose logs and note that the:  Fasting readings  are:above range on current regimen  Postprandial readings are:above range on current regimen  Current NPH dose: 0  Current Prandial insulin: 0  Blood glucose logs/meter brought in and data reviewed and incorporated into decision-making.  [facility]    Therapy/Interventions in the past:  She has been seen by the Diabetes Educator- and has received instruction on carbohydrate counting and  consistency.  Records from referring provider and other sources have also been reviewed and incorporated into decision-making.      TODAY:  Dandre is here today at the request of CDE because of DM 2 during pregnancy.  She is her with a professional . She has only had one baby here in the [place].  She has a hx of macrosomia, as well as placenta accreta.  She was supposed to be taking insulin as of last October, but she wasn't really interested in taking it then, and she still isn't.  She is barely testing her blood sugars at all and seems rather cavalier about it. She is refusing to get an A1c done today because she isn't feeling well.  I have given her a new NPH pen and asked her to start taking 12 units at HS.  I have also asked her to start testing as she is supposed to be doing.  She agreed to do so.    Past Medical History       Patient Active Problem List   Diagnosis     Obesity (BMI  "30.0-34.9)     Type 2 diabetes mellitus with hyperglycemia     Hyperlipidemia     Need for hepatitis B vaccination     Polycystic ovarian syndrome     High-risk pregnancy     Uterine fibroids affecting pregnancy     History of classical  section     Pap smear abnormality of cervix with LGSIL     Placenta accreta     Grand multiparity        Past Surgical History     Past Surgical History:   Procedure Laterality Date      SECTION, CLASSIC      Vertical skin scar     CHOLECYSTECTOMY         Family History     Family History   Problem Relation Age of Onset     No Medical Problems Mother      Diabetes Father      Developmental delay Son      No Medical Problems Sister      No Medical Problems Brother      No Medical Problems Sister      No Medical Problems Sister      No Medical Problems Sister      No Medical Problems Brother      No Medical Problems Brother      No Medical Problems Brother      No Medical Problems Brother      No Medical Problems Brother      No Medical Problems Brother        Social History     Social History   Substance Use Topics     Smoking status: Never Smoker     Smokeless tobacco: None     Alcohol use No       Review of Systems     Patient has no polyuria or polydipsia, no chest pain, dyspnea or TIA's, no numbness, tingling or pain in extremities  Remainder negative except as noted in HPI.      Vital Signs     BP 98/72 (Patient Site: Right Arm, Patient Position: Sitting, Cuff Size: Adult Regular)  Pulse 84  Ht 5' 2\" (1.575 m)  Wt 182 lb (82.6 kg)  LMP 2017 (Exact Date) Comment: FINAL EDC. - LMR  BMI 33.29 kg/m2  Wt Readings from Last 3 Encounters:   18 182 lb (82.6 kg)   18 180 lb (81.6 kg)   17 168 lb (76.2 kg)       Physical Exam     GENERAL: Pleasant, alert, appropriate appearance for age. No acute distress,   HEENT: Normocephalic, atraumatic  NECK: Supple, no masses or  lymph nodes.  THYROID: No nodules or enlargement. Non-tender, no " bruit  CHEST/RESPIRATORY: Normal chest wall and respirations. Clear to auscultation.  CARDIOVASCULAR: Regular rate and rhythm. S1, S2, no murmur, click, gallop, or rubs.  ABDOMEN: Gravid   LYMPHATIC: No palpable nodes in neck, supraclavicular,  SKIN: No melanosis,  ecchymosis,  vitiligo. No acanthosis nigricans  NEURO:  Non-focal, normal DTRs; no tremor.  PSYCH: Alert and oriented -appropriate affect. Orientation, judgement and memory appear intact.  MSK: No joint abnormalities, FROM in all four extremities. No kyphosis    Assessment     1. Type 2 diabetes mellitus with hyperglycemia    2. 30 weeks gestation of pregnancy        Plan     1. GESTATIONAL DIABETES-  Adjust dose as follows:    -NPH [id number] units. Increase by 2 units every 2 days to keep fasting blood glucose below 95mg/dL  -Novolog 0  units with breakfast  -Novolog 0 units with lunch   -Novolog 0 units with dinner  -Increase by 0 units every 2 days to keep 1 hour after meal blood glucose less than 140mg/dL    We reviewed glucose goals of fasting blood glucose <95 mg/dL and 1 hour post prandial blood glucose of <140 mg/dL.    Monitor blood sugar 4 times daily: Fasting  and 1 hour after each meal.  Contact  this clinic [phone] if blood glucose is not within the above-mentioned goals.     We discussed the importance of excellent glycemic control during pregnancy to limit complications such as fetal macrosomia, shoulder dystocia,  hypoglycemia and hyperbilirubinemia.  I have discussed the patient's increased risk of recurrent GDM and/or development of type 2 diabetes later in life.        I asked her to come back next week, and we will see if that happens.  Time spent with pt today: 25 min with >50% spent in counseling and coordination of care.        Vanna Giles  2018      Lab Results     Hemoglobin A1c   Date Value Ref Range Status   10/26/2017 6.0 3.5 - 6.0 % Final   2017 6.8 (H) 3.5 - 6.0 % Final   2017 6.8 (H) 3.5 -  "6.0 % Final   10/27/2016 6.8 (H) 3.5 - 6.0 % Final   07/15/2016 6.7 (H) 3.5 - 6.0 % Final     Creatinine   Date Value Ref Range Status   11/18/2017 0.60 0.60 - 1.10 mg/dL Final   10/07/2017 0.61 0.60 - 1.10 mg/dL Final   07/24/2017 0.62 0.60 - 1.10 mg/dL Final     Microalbumin, Random Urine   Date Value Ref Range Status   02/24/2017 1.29 0.00 - 1.99 mg/dL Final       Cholesterol   Date Value Ref Range Status   02/16/2016 270 (H) <=199 mg/dL Final     HDL Cholesterol   Date Value Ref Range Status   02/16/2016 58 >=50 mg/dL Final     LDL Calculated   Date Value Ref Range Status   02/16/2016 186 (H) <=129 mg/dL Final     Triglycerides   Date Value Ref Range Status   02/16/2016 132 <=149 mg/dL Final       Lab Results   Component Value Date    ALT 19 02/24/2017    AST 15 02/24/2017    ALKPHOS 77 02/24/2017    BILITOT 0.8 02/24/2017         Current Medications     Outpatient Medications Prior to Visit   Medication Sig Dispense Refill     acetone, urine, test Strp Test each morning 30 strip 6     alcohol swabs (ALCOHOL WIPES) PadM Use to check blood sugar once daily. 50 each 6     blood glucose test (CONTOUR NEXT STRIPS) strips Check fasting bg and 1 hour after each meal, 4 times daily. 120 strip 10     generic lancets (MICROLET LANCET) Check bg once daily. 50 each 6     HUMULIN N KWIKPEN 100 unit/mL (3 mL) pen Inject 12 Units under the skin at bedtime. (Patient taking differently: Inject 10 Units under the skin at bedtime. ) 15 mL 1     lidocaine (XYLOCAINE) 2 % jelly   0     MAPAP EXTRA STRENGTH 500 mg tablet   1     pen needle, diabetic (BD ULTRA-FINE PABLO PEN NEEDLES) 32 gauge x 5/32\" Ndle Use 1 each As Directed daily. 100 each 3     prenatal vit no.112-folate no6 1 mg Chew Chew 1 tablet daily. 100 tablet 3     HUMULIN N KWIKPEN 100 unit/mL (3 mL) pen Inject 12 Units under the skin at bedtime. 15 mL 3     No facility-administered medications prior to visit.        "

## 2021-06-16 PROBLEM — D50.9 IRON DEFICIENCY ANEMIA: Status: ACTIVE | Noted: 2018-05-17

## 2021-06-16 PROBLEM — D21.9 FIBROIDS: Status: ACTIVE | Noted: 2017-08-31

## 2021-06-16 PROBLEM — R87.612 PAP SMEAR ABNORMALITY OF CERVIX WITH LGSIL: Status: ACTIVE | Noted: 2017-09-14

## 2021-06-16 PROBLEM — Z31.69 ENCOUNTER FOR PRECONCEPTION CONSULTATION: Status: ACTIVE | Noted: 2018-11-19

## 2021-06-16 PROBLEM — N93.9 ABNORMAL UTERINE BLEEDING: Status: ACTIVE | Noted: 2018-05-10

## 2021-06-16 NOTE — ANESTHESIA POSTPROCEDURE EVALUATION
Patient: Dandre Leyva   SECTION  Anesthesia type: No value filed.    Patient location: Labor and Delivery  Last vitals:   Vitals:    18 0559   BP: (!) 150/92   Pulse: 77   Resp: 18   Temp: 36.8  C (98.2  F)   SpO2: 100%     Post vital signs: stable  Level of consciousness: awake and responds to simple questions  Post-anesthesia pain: pain controlled  Post-anesthesia nausea and vomiting: no  Pulmonary: unassisted, return to baseline  Cardiovascular: stable and blood pressure at baseline  Hydration: adequate  Anesthetic events: no    QCDR Measures:  ASA# 11 - Aileen-op Cardiac Arrest: ASA11B - Patient did NOT experience unanticipated cardiac arrest  ASA# 12 - Aileen-op Mortality Rate: ASA12B - Patient did NOT die  ASA# 13 - PACU Re-Intubation Rate: NA - No ETT / LMA used for case  ASA# 10 - Composite Anes Safety: ASA10A - No serious adverse event    Additional Notes:

## 2021-06-16 NOTE — ANESTHESIA PROCEDURE NOTES
Spinal Block    Patient location during procedure: OB  Start time: 3/8/2018 3:08 PM  End time: 3/8/2018 3:12 PM  Reason for block: primary anesthetic    Staffing:  Performing  Anesthesiologist: WALT PAYTON    Preanesthetic Checklist  Completed: patient identified, risks, benefits, and alternatives discussed, timeout performed, consent obtained, at patient's request, airway assessed, oxygen available, suction available, emergency drugs available and hand hygiene performed  Spinal Block  Patient position: sitting  Prep: ChloraPrep  Patient monitoring: heart rate, cardiac monitor, continuous pulse ox and blood pressure  Approach: midline  Location: L3-4  Injection technique: single-shot  Needle type: pencil-tip   Needle gauge: 24 G    Assessment  Sensory level: T8

## 2021-06-16 NOTE — ANESTHESIA CARE TRANSFER NOTE
Last vitals:   Vitals:    03/08/18 1609   BP: 105/54   Pulse: 81   Resp: 18   Temp: 36.2  C (97.1  F)   SpO2: 99%     Patient's level of consciousness is drowsy  Spontaneous respirations: yes  Maintains airway independently: yes  Dentition unchanged: yes  Oropharynx: oropharynx clear of all foreign objects    QCDR Measures:  ASA# 20 - Surgical Safety Checklist: WHO surgical safety checklist completed prior to induction  PQRS# 430 - Adult PONV Prevention: 4558F - Pt received => 2 anti-emetic agents (different classes) preop & intraop  ASA# 8 - Peds PONV Prevention: NA - Not pediatric patient, not GA or 2 or more risk factors NOT present  PQRS# 424 - Aileen-op Temp Management: 4559F - At least one body temp DOCUMENTED => 35.5C or 95.9F within required timeframe  PQRS# 426 - PACU Transfer Protocol: - Transfer of care checklist used  ASA# 14 - Acute Post-op Pain: ASA14B - Patient did NOT experience pain >= 7 out of 10

## 2021-06-16 NOTE — ANESTHESIA PREPROCEDURE EVALUATION
Anesthesia Evaluation      Patient summary reviewed   No history of anesthetic complications     Airway   Mallampati: III  Neck ROM: full   Pulmonary - negative ROS and normal exam                          Cardiovascular - normal exam  (+) hypertension, , hypercholesterolemia,        ROS comment: Preeclampsia, severe features, anticoagulation studies normal     Neuro/Psych - negative ROS     Endo/Other    (+) diabetes mellitus type 2,      GI/Hepatic/Renal    (+) GERD,        Other findings: Placenta accreta      Dental - normal exam                        Anesthesia Plan  Planned anesthetic: spinal    ASA 3 - emergent   Induction: intravenous   Anesthetic plan and risks discussed with: patient    Post-op plan: routine recovery

## 2021-06-17 NOTE — PATIENT INSTRUCTIONS - HE
Patient Instructions by Som Emmanuel MD at 12/29/2019  9:20 AM     Author: Som Emmanuel MD Service: -- Author Type: Physician    Filed: 12/29/2019 11:14 AM Encounter Date: 12/29/2019 Status: Addendum    : Som Emmanuel MD (Physician)    Related Notes: Original Note by Som Emmanuel MD (Physician) filed at 12/29/2019 11:13 AM       -Take meclizine as needed for dizziness / vertigo.  -Have referred you to Physical Therapy for evaluation / treatment of your vertigo. You should receive a call from the Specialty Schedulers within 1-2 business days. Please call [facility] at [phone] to check the status of this referral if you have not received any information regarding this appointment within 3 business days.  -Please take Tamiflu once daily as directed to prevent getting Influenza B from your son since you are considered high risk for severe / complicated illness due to your diabetes.   Patient Education     Benign Paroxysmal Positional Vertigo    Benign paroxysmal positional vertigo is a common condition. You feel as if the room is spinning after changing position, moving your head quickly, or even just rolling over in bed.  Vertigo is a false feeling of motion plus disorientation that makes it seem as though the room is spinning. A vertigo attack may cause sudden nausea, vomiting, and heavy sweating. Severe vertigo causes a loss of balance. You may even fall down.  Vertigo is caused by a problem with the inner ear. The inner ear is located behind the middle ear. It is a part of the balance center of the body. It contains small calcium particles within fluid-filled canals (semi-circular canals). These particles can move out of position. This may happen as a result of aging, head injury, or disease of the inner ear. Once that happens, moving your head in certain ways may cause the particles to stimulate the inner ear. This creates the feeling of vertigo.  An episode of vertigo  may last seconds, minutes, or hours. Once you are over the first episode of vertigo, it may never return. Sometimes symptoms return off and on for several weeks or longer.  Home care  Follow these guidelines when caring for yourself at home:    Rest quietly in bed if your symptoms are severe. Change position slowly. There is usually 1 position that will feel best. This might be lying on 1 side or lying on your back with your head slightly raised on pillows. Until you have no symptoms, you are at a higher risk of falling. Let someone help you when you get up. Get rid of home hazards such as loose electrical cords and throw rugs. Dont walk in unfamiliar areas that are not lighted. Use night lights in bathrooms and kitchen areas.    Do not drive or work with dangerous machinery for 1 week after symptoms go away. This is in case symptoms return suddenly.    Take medicine as prescribed to relieve your symptoms. Unless another medicine was prescribed for nausea, vomiting, and vertigo, you may use over-the-counter motion sickness medicine. Examples of this include meclizine and dimenhydrinate.  Follow-up care  Follow up with your healthcare provider, or as directed. Tell your provider about any ringing in your ear or hearing loss.  If you had a CT or MRI scan, a specialist will review it. You will be told of any new findings that may affect your care.  When to seek medical advice  Call your healthcare provider right away if any of these occur:    Vertigo gets worse even after taking prescribed medicine    Repeated vomiting even after taking prescribed medicine    Weakness that gets worse    Fainting    Severe headache or unusual drowsiness or confusion    Weakness of an arm or leg or 1 side of the face    Trouble walking    Trouble with speech or vision    Seizure    Trouble hearing    Fever of 100.4 F (38 C) or higher, or as directed by your healthcare provider    Fast heart rate    Chest pain   Date Last Reviewed:  11/1/2017 2000-2017 [facility]. [address] [place] [address], [place] [address]. All rights reserved. This information is not intended as a substitute for professional medical care. Always follow your healthcare professional's instructions.           Patient Education     Influenza (Adult)    Influenza is also called the flu. It is a viral illness that affects the air passages of your lungs. It is different from the common cold. The flu can easily be passed from one to person to another. It may be spread through the air by coughing and sneezing. Or it can be spread by touching the sick person and then touching your own eyes, nose, or mouth.  The flu starts 1 to 3 days after you are exposed to the flu virus. It may last for 1 to 2 weeks but many people feel tired or fatigued for many weeks afterward. You usually dont need to take antibiotics unless you have a complication. This might be an ear or sinus infection or pneumonia.  Symptoms of the flu may be mild or severe. They can include extreme tiredness (wanting to stay in bed all day), chills, fevers, muscle aches, soreness with eye movement, headache, and a dry, hacking cough.  Home care  Follow these guidelines when caring for yourself at home:    Avoid being around cigarette smoke, whether yours or other peoples.    Acetaminophen or ibuprofen will help ease your fever, muscle aches, and headache. Dont give aspirin to anyone younger than 18 who has the flu. Aspirin can harm the liver.    Nausea and loss of appetite are common with the flu. Eat light meals. Drink 6 to 8 glasses of liquids every day. Good choices are water, sport drinks, soft drinks without caffeine, juices, tea, and soup. Extra fluids will also help loosen secretions in your nose and lungs.    Over-the-counter cold medicines will not make the flu go away faster. But the medicines may help with coughing, sore throat, and congestion in your nose and sinuses. Dont use a decongestant if you  have high blood pressure.    Stay home until your fever has been gone for at least 24 hours without using medicine to reduce fever.  Follow-up care  Follow up with your healthcare provider, or as advised, if you are not getting better over the next week.  If you are age 65 or older, talk with your provider about getting a pneumococcal vaccine every 5 years. You should also get this vaccine if you have chronic asthma or COPD. All adults should get a flu vaccine every fall. Ask your provider about this.  When to seek medical advice  Call your healthcare provider right away if any of these occur:    Cough with lots of colored mucus (sputum) or blood in your mucus    Chest pain, shortness of breath, wheezing, or trouble breathing    Severe headache, or face, neck, or ear pain    New rash with fever    Fever of 100.4 F (38 C) or higher, or as directed by your healthcare provider    Confusion, behavior change, or seizure    Severe weakness or dizziness    You get a new fever or cough after getting better for a few days  Date Last Reviewed: 1/1/2017 2000-2017 [employer]. [address] [place] [address]. All rights reserved. This information is not intended as a substitute for professional medical care. Always follow your healthcare professional's instructions.

## 2021-06-17 NOTE — PATIENT INSTRUCTIONS - HE
Patient Instructions by Perri Day MD at 7/11/2019  1:00 PM     Author: Perri Day MD Service: -- Author Type: Physician    Filed: 7/11/2019  1:51 PM Encounter Date: 7/11/2019 Status: Addendum    : Perri Day MD (Physician)    Related Notes: Original Note by Perri Day MD (Physician) filed at 7/11/2019  1:51 PM       Apply Vanicream to dry skin on face twice a day, until rash is better.

## 2021-06-17 NOTE — PROGRESS NOTES
" Subjective    Dandre Leyva is a 38 y.o. female who presents for abnormal uterine bleeding.    Dandre is a 38-year-old  a 116 who presents today for evaluation of abnormal uterine bleeding.  The patient delivered via repeat  section on 3/8/18.  Pregnancy was complicated by uncontrolled type 2 diabetes as well as preeclampsia with severe features.  She was induced due to blood pressure issues and delivered  at 36+6 weeks, giving birth to an 8 lbs. 5 oz. infant.  Baby developed significant hypoglycemia and needed IV glucose.    Since delivery, she has been having intermittent spotting.  She had normal lochia, then developed spotting only if she lifts something heavy.  Then, she gets bleeding and spotting.  She only had one period since her baby was born.  She states that she is \"probably\" not pregnant.  Currently spotting is brown in color and not malodorous.  It is not accompanied by pain.  The patient is not breast-feeding.    The patient does have a history of abnormal Pap smear.  She had colposcopy in 2017 which showed VEGA-1.  Recommended follow-up Pap smear with HPV testing postpartum.    She reports that since delivery, she has not been taking care of her diabetes and that she stopped her blood pressure medication.  She thinks that her sugars have been fairly high and is motivated to start eating better.  She is not currently on any medication for her diabetes.     Objective    Blood pressure 120/82, pulse 90, temperature 97  F (36.1  C), temperature source Oral, resp. rate 20, height 5' 1\" (1.549 m), weight 174 lb (78.9 kg), SpO2 97 %, unknown if currently breastfeeding. Body mass index is 32.88 kg/(m^2). Patient reports that she has never smoked. She does not have any smokeless tobacco history on file.    Gen: Alert, no apparent distress.  Heart: Regular rate and rhythm, no murmurs.  Lungs: Clear to auscultation bilaterally, no increased work of breathing.  Abdomen: Soft, non-tender, " non-distended, bowel sounds normal.  Genitourinary: Vulva, vagina, and cervix are normal in appearance.  No cervical motion tenderness or adnexal tenderness.  No adnexal fullness.  There is a small amount of mucousy chocolate brown discharge in the cervical os.  Extremities: No clubbing, cyanosis, edema.    Results for orders placed or performed in visit on 05/10/18   Wet Prep, Vaginal   Result Value Ref Range    Yeast Result No yeast seen No yeast seen    Trichomonas No Trichomonas seen No Trichomonas seen    Clue Cells, Wet Prep No Clue cells seen No Clue cells seen   Glycosylated Hemoglobin A1c   Result Value Ref Range    Hemoglobin A1c 7.1 (H) 3.5 - 6.0 %   HM2(CBC w/o Differential)   Result Value Ref Range    WBC 5.5 4.0 - 11.0 thou/uL    RBC 4.11 3.80 - 5.40 mill/uL    Hemoglobin 11.9 (L) 12.0 - 16.0 g/dL    Hematocrit 37.0 35.0 - 47.0 %    MCV 90 80 - 100 fL    MCH 28.9 27.0 - 34.0 pg    MCHC 32.1 32.0 - 36.0 g/dL    RDW 11.3 11.0 - 14.5 %    Platelets 257 140 - 440 thou/uL    MPV 6.5 (L) 7.0 - 10.0 fL   Pregnancy (Beta-hCG, Qual), Urine   Result Value Ref Range    Pregnancy Test, Urine Negative Negative    Specific Gravity, UA 1.025 1.001 - 1.030     No results found.       Assessment & Plan      Dandre is a 38 y.o. female who is here today for Follow-up      1. Abnormal uterine bleeding, 2 months postpartum.  Probably still related to postpartum.  But prep, gonorrhea, chlamydia, and Pap smear obtained.  2. History of abnormal Pap smear.  Recheck Pap smear with HPV testing today.  3. Type 2 diabetes, uncontrolled.  Refilled test strips.  Patient will follow-up next week regarding diabetes.  4. History of preeclampsia with severe features, induced .  Now normotensive, off of blood pressure medications.  Reviewed with patient the risk of hypertension later in life.  5. Follow-up results in 1 week.    1. Abnormal uterine bleeding  Wet Prep, Vaginal    Chlamydia trachomatis & Neisseria gonorrhoeae,  Amplified Detection    Pregnancy (Beta-hCG, Qual), Urine    Gynecologic Cytology (PAP Smear)   2. Diabetes mellitus due to underlying condition with hyperglycemia  alcohol swabs (ALCOHOL WIPES) PadM    blood glucose test (CONTOUR NEXT TEST STRIPS) strips    generic lancets (MICROLET LANCET)   3. Type 2 diabetes mellitus with hyperglycemia  Glycosylated Hemoglobin A1c    Comprehensive Metabolic Panel    Microalbumin, Random Urine    Thyroid Cascade    HM2(CBC w/o Differential)   4. Pap smear abnormality of cervix with LGSIL  Gynecologic Cytology (PAP Smear)   5. Hyperlipidemia  Lipid [place]       Future Appointments  Date Time Provider Department Center   5/17/2018 9:40 AM Perri Day MD [facility] [place] [facility]        This transcription uses voice recognition software, which may contain typographical errors.

## 2021-06-18 NOTE — PROGRESS NOTES
Subjective    Dandre Leyva is a 38 y.o. female who presents for lab follow-up.    Today, we reviewed the labs were done her last visit.  Discussion included:  High cholesterol.  The patient's mother has a history of high cholesterol.  She has never been on medication for it before. she was NOT fasting when she had her lipid panel done last week.  She wants to know what she can do for her cholesterol besides pills.    Diabetes has borderline control.  Recent pregnancy may contribute to the A1c number.  The patient wonders if she should his medication or not.  Discussed benefits of metformin, particularly for her with her PCOS.  She would like to try it for couple of months and recheck her A1c.  She is thinking she might like to use it on a as needed basis.  Discussed that this is typically used daily.    Mild anemia is noted.  This is probably due to recent  and pregnancy.  The patient would like an iron pill prescription.  Discussed taking it for 6 months, then stopping.    Hepatitis B nonimmune.  She is immune to hepatitis A.     Objective    Blood pressure 104/70, pulse 76, temperature 97.4  F (36.3  C), temperature source Oral, resp. rate 14, weight 171 lb (77.6 kg), last menstrual period 2018, SpO2 (!) 7 %, unknown if currently breastfeeding. Body mass index is 32.31 kg/(m^2). Patient reports that she has never smoked. She has never used smokeless tobacco.    Gen: Alert, no apparent distress.  Heart: Regular rate and rhythm, no murmurs.  Lungs: Clear to auscultation bilaterally, no increased work of breathing.  Abdomen: Soft, non-tender, non-distended, bowel sounds normal.  Extremities: No clubbing, cyanosis, edema.    Results for orders placed or performed in visit on 05/10/18   Wet Prep, Vaginal   Result Value Ref Range    Yeast Result No yeast seen No yeast seen    Trichomonas No Trichomonas seen No Trichomonas seen    Clue Cells, Wet Prep No Clue cells seen No Clue cells seen   Chlamydia  trachomatis & Neisseria gonorrhoeae, Amplified Detection   Result Value Ref Range    Chlamydia trachomatis, Amplified Detection Negative Negative    Neisseria gonorrhoeae, Amplified Detection Negative Negative   Glycosylated Hemoglobin A1c   Result Value Ref Range    Hemoglobin A1c 7.1 (H) 3.5 - 6.0 %   Comprehensive Metabolic Panel   Result Value Ref Range    Sodium 140 136 - 145 mmol/L    Potassium 3.9 3.5 - 5.0 mmol/L    Chloride 107 98 - 107 mmol/L    CO2 25 22 - 31 mmol/L    Anion Gap, Calculation 8 5 - 18 mmol/L    Glucose 135 (H) 70 - 125 mg/dL    BUN 13 8 - 22 mg/dL    Creatinine 0.60 0.60 - 1.10 mg/dL    GFR MDRD Af Amer >60 >60 mL/min/1.73m2    GFR MDRD Non Af Amer >60 >60 mL/min/1.73m2    Bilirubin, Total 0.6 0.0 - 1.0 mg/dL    Calcium 9.2 8.5 - 10.5 mg/dL    Protein, Total 7.2 6.0 - 8.0 g/dL    Albumin 4.0 3.5 - 5.0 g/dL    Alkaline Phosphatase 101 45 - 120 U/L    AST 15 0 - 40 U/L    ALT 24 0 - 45 U/L   Microalbumin, Random Urine   Result Value Ref Range    Microalbumin, Random Urine 0.86 0.00 - 1.99 mg/dL    Creatinine, Urine 85.0 mg/dL    Microalbumin/Creatinine Ratio Random Urine 10.1 <=19.9 mg/g   Thyroid Cascade   Result Value Ref Range    TSH 2.46 0.30 - 5.00 uIU/mL   HM2(CBC w/o Differential)   Result Value Ref Range    WBC 5.5 4.0 - 11.0 thou/uL    RBC 4.11 3.80 - 5.40 mill/uL    Hemoglobin 11.9 (L) 12.0 - 16.0 g/dL    Hematocrit 37.0 35.0 - 47.0 %    MCV 90 80 - 100 fL    MCH 28.9 27.0 - 34.0 pg    MCHC 32.1 32.0 - 36.0 g/dL    RDW 11.3 11.0 - 14.5 %    Platelets 257 140 - 440 thou/uL    MPV 6.5 (L) 7.0 - 10.0 fL   Lipid Cascade   Result Value Ref Range    Cholesterol 327 (H) <=199 mg/dL    Triglycerides 131 <=149 mg/dL    HDL Cholesterol 53 >=50 mg/dL    LDL Calculated 248 (H) <=129 mg/dL    Patient Fasting > 8hrs? No    Pregnancy (Beta-hCG, Qual), Urine   Result Value Ref Range    Pregnancy Test, Urine Negative Negative    Specific Gravity, UA 1.025 1.001 - 1.030   Gynecologic Cytology (PAP  Smear)   Result Value Ref Range    Case Report       Gynecologic Cytology Report                       Case: [id number]                                   Authorizing Provider:  Perri Day MD         Collected:           05/10/2018 1210              Ordering Location:     [facility]  Received:            05/10/2018 1211                                     Medicine/OB                                                                  First Screen:          KAMRAN Dennis                                                                                (ASCP)                                                                       Rescreen:              KAMRAN Huynh                                                                             (ASCP)                                                                       Specimen:    SUREPATH PAP, SCREENING, Endocervical/cervical                                             Interpretation       Negative for squamous intraepithelial lesion or malignancy    Result Flag Normal Normal    Specimen Adequacy       Satisfactory for evaluation, endocervical/transformation zone component present    HPV Reflex? Yes regardless of result     HIGH RISK Yes     LMP/Menopause Date 3/8/18 (childbirth)     Abnormal Bleeding Yes     Pt Status Postpartum C/S 3/8/18     Birth Control/Hormones None     Previous Normal/Date 1/16/13     Prev Abn Date/Dx 11/6/17 - colposcopy: CIN1 ([facility])     Cervical Appearance Normal    HPV High Risk DNA Cervical   Result Value Ref Range    HPV Source SurePath     HPV16 DNA Negative NEG    HPV18 DNA Negative NEG    Other HR HPV Negative NEG    Final Diagnosis SEE NOTES     Specimen Description Cervical Cells      No results found.       Assessment & Plan      Dandre is a 38 y.o. female who is here today for Follow-up (Cholesterol and Diabetes)      1. Hyperlipidemia.  Suspect familial.  Recheck fasting lipid panel today.  The  patient would like to modify diet and exercise before starting medication.  She is going to come back in 3 months to check a repeat fasting lipid panel as well.  2. Type 2 diabetes, uncontrolled.  Hemoglobin A1c 7.1%.  Discussed modification of diet and exercise.  Start metformin 500 mg daily (note that the patient might only use intermittently).  3. Iron deficiency anemia related to recent pregnancy and .  Take ferrous sulfate 325 mg daily for 6 months.  4. History of abnormal Pap smear in 2017 with VEGA-1 on colposcopy.  Recent Pap smear was normal.  Recommend repeat Pap smear in 6 months due to recent abnormality.  5. Hepatitis B nonimmune.  Hepatitis B immunization given today.    1. Hyperlipidemia  Lipid Cascade   2. Type 2 diabetes mellitus with hyperglycemia, without long-term current use of insulin  metFORMIN (GLUCOPHAGE) 500 MG tablet   3. Iron deficiency anemia  ferrous sulfate 325 (65 FE) MG tablet   4. Pap smear abnormality of cervix with LGSIL     5. Need for hepatitis B vaccination  Hepatitis B Vaccine Age 20 years and above       Future Appointments  Date Time Provider Department Center   2018 9:20 AM [facility]   2018 10:00 AM Perri Day MD [facility]        This transcription uses voice recognition software, which may contain typographical errors.

## 2021-06-19 NOTE — LETTER
Letter by Perri Day MD at      Author: Perri Day MD Service: -- Author Type: --    Filed:  Encounter Date: 6/3/2019 Status: (Other)         Dandre Leyva  [address] [place] [address]             June 20, 2019        Dear Ms. Leyva,    Below are the results from your recent visit:    Resulted Orders   Gynecologic Cytology (PAP Smear)   Result Value Ref Range    Case Report       Gynecologic Cytology Report                       Case: [id number]                                   Authorizing Provider:  Perri Day MD        Collected:           05/21/2019 1208              Ordering Location:     [facility]  Received:            05/21/2019 1208                                     Medicine/OB                                                                  First Screen:          Marlen Vyas CT                                                                            (ASCP)                                                                       Pathologist:           Santy Brian MD                                                        Specimen:    SUREPATH PAP, SCREENING, Endocervical/cervical                                             Interpretation (!) Negative for squamous intraepithelial lesion or malignancy.      Atypical squamous cells of undetermined significance    Result Flag Abnormal (!) Normal    Specimen Adequacy       Satisfactory for evaluation, endocervical/transformation zone component present    HPV Reflex? Yes regardless of result     HIGH RISK No     LMP/Menopause Date 5/14/19     Abnormal Bleeding No     Pt Status n/a     Birth Control/Hormones None     Previous Normal/Date 5/10/18     Prev Abn Date/Dx 11/6/17 - VEGA 1 ([facility])     Cervical Appearance Normal     Interpreting Lab       All Pap smear slide preparation and cytotechnologist screening for [facility] is performed at [facility], [address] [place] [address],  [id number]. Final interpretation of this case was done at [facility], [facility] [facility], [place], [address].   Chlamydia trachomatis & Neisseria gonorrhoeae, Amplified Detection   Result Value Ref Range    Chlamydia trachomatis, Amplified Detection Positive (!) Negative    Neisseria gonorrhoeae, Amplified Detection Negative Negative   Wet Prep, Vaginal   Result Value Ref Range    Yeast Result No yeast seen No yeast seen    Trichomonas No Trichomonas seen No Trichomonas seen    Clue Cells, Wet Prep No Clue cells seen No Clue cells seen   Comprehensive Metabolic Panel   Result Value Ref Range    Sodium 140 136 - 145 mmol/L    Potassium 4.1 3.5 - 5.0 mmol/L    Chloride 102 98 - 107 mmol/L    CO2 25 22 - 31 mmol/L    Anion Gap, Calculation 13 5 - 18 mmol/L    Glucose 127 (H) 70 - 125 mg/dL    BUN 8 8 - 22 mg/dL    Creatinine 0.62 0.60 - 1.10 mg/dL    GFR MDRD Af Amer >60 >60 mL/min/1.73m2    GFR MDRD Non Af Amer >60 >60 mL/min/1.73m2    Bilirubin, Total 0.8 0.0 - 1.0 mg/dL    Calcium 10.0 8.5 - 10.5 mg/dL    Protein, Total 8.0 6.0 - 8.0 g/dL    Albumin 4.5 3.5 - 5.0 g/dL    Alkaline Phosphatase 64 45 - 120 U/L    AST 13 0 - 40 U/L    ALT 14 0 - 45 U/L    Narrative    Fasting Glucose reference range is 70-99 mg/dL per  American Diabetes Association (ADA) guidelines.   HM2(CBC w/o Differential)   Result Value Ref Range    WBC 6.2 4.0 - 11.0 thou/uL    RBC 4.62 3.80 - 5.40 mill/uL    Hemoglobin 13.8 12.0 - 16.0 g/dL    Hematocrit 40.9 35.0 - 47.0 %    MCV 88 80 - 100 fL    MCH 29.8 27.0 - 34.0 pg    MCHC 33.7 32.0 - 36.0 g/dL    RDW 11.2 11.0 - 14.5 %    Platelets 267 140 - 440 thou/uL    MPV 7.2 7.0 - 10.0 fL   Lipid Cascade   Result Value Ref Range    Cholesterol 292 (H) <=199 mg/dL    Triglycerides 101 <=149 mg/dL    HDL Cholesterol 68 >=50 mg/dL    LDL Calculated 204 (H) <=129 mg/dL    Patient Fasting > 8hrs? Yes    Microalbumin, Random Urine   Result Value Ref Range    Microalbumin, Random Urine 1.49 0.00 - 1.99  mg/dL    Creatinine, Urine 137.1 mg/dL    Microalbumin/Creatinine Ratio Random Urine 10.9 <=19.9 mg/g    Narrative    Microalbumin, Random Urine  <2.0 mg/dL . . . . . . . . Normal  3.0-30.0 mg/dL . . . . . . Microalbuminuria  >30.0 mg/dL . . . . . .  . Clinical Proteinuria  Microalbumin/Creatinine Ratio, Random Urine  <20 mg/g . . . . .. . . . Normal   mg/g . . . . . . . Microalbuminuria  >300 mg/g . . . . . . . . Clinical Proteinuria   Thyroid Cascade   Result Value Ref Range    TSH 1.35 0.30 - 5.00 uIU/mL   Vitamin D, Total (25-Hydroxy)   Result Value Ref Range    Vitamin D, Total (25-Hydroxy) 15.8 (L) 30.0 - 80.0 ng/mL    Narrative    Deficiency <10.0 ng/mL  Insufficiency 10.0-29.9 ng/mL  Sufficiency 30.0-80.0 ng/mL  Toxicity (possible) >100.0 ng/mL   Iron and Transferrin Iron Binding Capacity   Result Value Ref Range    Iron 74 42 - 175 ug/dL    Transferrin 375 (H) 212 - 360 mg/dL    Transferrin Saturation, Calculated 16 (L) 20 - 50 %    Transferrin IBC, Calculated 469 313 - 563 ug/dL   Pregnancy (Beta-hCG, Qual), Urine   Result Value Ref Range    Pregnancy Test, Urine Negative Negative    Narrative    This test is for screening purposes. Results should be interpreted along with the clinical picture. Confirmation testing is available if warranted by ordering Test 143, Beta HCG, Quantitative.   HPV High Risk DNA Cervical   Result Value Ref Range    HPV Source SurePath     HPV16 DNA Negative NEG    HPV18 DNA Negative NEG    Other HR HPV Negative NEG    Final Diagnosis SEE NOTES       Comment:      This patient's sample is negative for HPV DNA.  This test was developed and its performance characteristics determined by the  [facility], [facility]. It  has not been cleared or approved by the FDA. The laboratory is regulated under  CLIA as qualified to perform high-complexity testing. This test is used for  clinical purposes. It should not be regarded as  investigational or for  research.  (Note)  METHODOLOGY:  The Roche mohsen 4800 system uses automated extraction,  simultaneous amplification of HPV (L1 region) and beta-globin,  followed by  real time detection of fluorescent labeled HPV and beta  globin using specific oligonucleotide probes . The test specifically  identifies types HPV 16 DNA and HPV 18 DNA while concurrently  detecting the rest of the high risk types (31, 33, 35, 39, 45, 51,  52, 56, 58, 59, 66 or 68).    COMMENTS:  This test is not intended for use as a screening device  for women under age 30 with normal cervical   cytology.  Results should  be correlated with cytologic and histologic findings. Close clinical  followup is recommended.        Specimen Description Cervical Cells       Comment:      Performed and/or entered by:  [place] [address]       Your ultrasound result was normal.   Your Pap smear is still a little bit abnormal. Due to the abnormal Pap smear, you should have her next  Pap smear in 1 year.      Please schedule follow up appointment.       Please call with questions or contact us using [employer].    Sincerely,        Electronically signed by Perri Day MD

## 2021-06-19 NOTE — LETTER
"Letter by Perri Day MD at      Author: Perri Day MD Service: -- Author Type: --    Filed:  Encounter Date: 5/31/2019 Status: (Other)         Dandre Leyva  [address] [place] [address]             May 31, 2019        Dear Ms. Leyva,    Below are the results from your recent visit:     Your Pap smear is abnormal.      It shows \"atypical squamous cells of undetermined significance,\" or ASCUS.      ASCUS means that there is a mild change in how the skin cells on the cervix look under the microscope.    Your test does NOT show the HPV virus that can cause cervical cancer.    Due to the abnormal Pap smear, you should have your next Pap smear done in 1 year.    Resulted Orders 5/21/19   Gynecologic Cytology (PAP Smear)   Result Value Ref Range    Pap Smear Interpretation       Atypical squamous cells of undetermined significance   Result Value Ref Range    HPV Source SurePath     HPV16 DNA Negative NEG    HPV18 DNA Negative NEG    Other HR HPV Negative NEG       Please call with questions or contact us using [employer].    Sincerely,        Electronically signed by Perri Day MD       "

## 2021-06-21 NOTE — PROGRESS NOTES
" Subjective    Dandre Leyva is a 38 y.o. female who presents for diabetes follow-up.    Not checking sugars, checked a few times in the last couple of months.  She reports that her sugars were \"OK\".  Meter shows the followin 9:29 a  149 10:05 p  212 11:53 a  194 11:52 a  131 2:15 p    Not currently taking any medication. Metformin made her feel hungry.  Exercise: treadmill - walks every night 30 min to one hour.  Diet: Trying to eat low carb diet.    Patient is considering pregnancy now.  She had a repeat  section in March.  She wants to know how long she needs to wait between pregnancies.  She is also not looking forward to having had to have a repeat .  Reviewed the reason for doing a repeat  section, specifically scar dehiscence.  She wanted to know how many  she could have, altogether.  Discussed increasing risk with each subsequent .  She only wants to have one more child.  She is not currently taking folic acid.  She does not like prenatal vitamins because they make her feel hungry.     Objective    Blood pressure 110/66, pulse 88, resp. rate 28, weight 167 lb (75.8 kg), unknown if currently breastfeeding. Body mass index is 30.54 kg/m . Patient reports that  has never smoked. she has never used smokeless tobacco.    Gen: Alert, no apparent distress.  Heart: Regular rate and rhythm, no murmurs.  Lungs: Clear to auscultation bilaterally, no increased work of breathing.  Abdomen: Soft, non-tender, non-distended, bowel sounds normal.  Extremities: No clubbing, cyanosis, edema.    Results for orders placed or performed in visit on 18   Glycosylated Hemoglobin A1c   Result Value Ref Range    Hemoglobin A1c 7.2 (H) 3.5 - 6.0 %   Hemoglobin   Result Value Ref Range    Hemoglobin 12.7 12.0 - 16.0 g/dL     No results found.       Assessment & Plan      Dandre is a 38 y.o. female who is here today for Diabetes and Abnormal Pap Smear      1. Type 2 diabetes, uncontrolled, " without complication. Hemoglobin A1c goal is less than 7.  Patient has no history of nephropathy, neuropathy, or retinopathy. Try metformin extended release instead of short acting due to gi intolerance. She is not interested in seeing ophthalmology because she does not like having her eyes dilated.  We had a long discussion about the benefit of annual eye exams for persons who have type 2 diabetes. Continue diet + exercise. Recheck A1c in 3 months.    2. Obesity, BMI 30.5, weight improving. Recommend additional 10 pounds weight loss.  3. Planning pregnancy. Recommend A1c and weight reduction. Discussed recommendation for repeat  section.  4. Hyperlipidemia. Very high LDL. NOT on statin due to planning pregnancy.   5. Iron deficiency anemia. Recheck hgb and iron studies.    1. Type 2 diabetes mellitus with hyperglycemia, without long-term current use of insulin (H)  Glycosylated Hemoglobin A1c    metFORMIN (GLUCOPHAGE-XR) 500 MG 24 hr tablet    Ambulatory referral to Ophthalmology    CANCELED: Ambulatory referral to Ophthalmology   2. Obesity (BMI 30.0-34.9)     3. Familial hyperlipidemia, high LDL  LDL Cholesterol, Direct   4. Pap smear abnormality of cervix with LGSIL     5. Abnormal uterine bleeding     6. Iron deficiency anemia due to chronic blood loss  Hemoglobin    Iron and Transferrin Iron Binding Capacity   7. Need for hepatitis B vaccination     8. Polycystic ovarian syndrome  metFORMIN (GLUCOPHAGE-XR) 500 MG 24 hr tablet   9. Fibroids     10. Planning pregnancy  folic acid (FOLVITE) 800 MCG tablet       No future appointments.     This transcription uses voice recognition software, which may contain typographical errors.

## 2021-07-03 NOTE — ADDENDUM NOTE
Addendum Note by Kirsten Martinez RD, CDE at 1/31/2018  6:18 PM     Author: Kirsten Martinez RD, CDE Service: -- Author Type: Diabetes Ed    Filed: 1/31/2018  6:18 PM Encounter Date: 1/31/2018 Status: Signed    : Kirsten Martinez RD, CDE (Diabetes Ed)    Addended by: KIRSTEN MARTINEZ on: 1/31/2018 06:18 PM        Modules accepted: Orders

## 2021-07-03 NOTE — ADDENDUM NOTE
Addendum Note by Karl Miner at 2/24/2017  5:44 PM     Author: Karl Miner Service: -- Author Type:     Filed: 2/24/2017  5:44 PM Encounter Date: 2/24/2017 Status: Signed    : Karl Miner ()    Addended by: KARL MINER on: 2/24/2017 05:44 PM        Modules accepted: Orders

## 2021-07-03 NOTE — ADDENDUM NOTE
Addendum Note by Qi Bergeron CMA at 2/2/2018  2:22 PM     Author: Qi Bergeron CMA Service: -- Author Type: Certified Medical Assistant    Filed: 2/2/2018  2:22 PM Encounter Date: 2/1/2018 Status: Signed    : Qi Bergeron CMA (Certified Medical Assistant)    Addended by: QI BERGERON on: 2/2/2018 02:22 PM        Modules accepted: Orders

## 2021-07-03 NOTE — ADDENDUM NOTE
Addendum Note by Virgie Delatorre RN at 2/1/2018 11:28 AM     Author: Virgie Delatorre RN Service: -- Author Type: Registered Nurse    Filed: 2/1/2018 11:28 AM Encounter Date: 2/1/2018 Status: Signed    : Virgie Delatorre RN (Registered Nurse)    Addended by: VIRGIE DELATORRE on: 2/1/2018 11:28 AM        Modules accepted: Orders

## 2021-07-14 PROBLEM — O14.10 PREECLAMPSIA, SEVERE: Status: RESOLVED | Noted: 2018-03-08 | Resolved: 2018-05-10

## 2023-12-20 ENCOUNTER — OFFICE VISIT (OUTPATIENT)
Dept: FAMILY MEDICINE | Facility: CLINIC | Age: 43
End: 2023-12-20
Payer: COMMERCIAL

## 2023-12-20 VITALS
OXYGEN SATURATION: 98 % | HEART RATE: 94 BPM | RESPIRATION RATE: 19 BRPM | TEMPERATURE: 98.2 F | BODY MASS INDEX: 33.9 KG/M2 | HEIGHT: 62 IN | SYSTOLIC BLOOD PRESSURE: 116 MMHG | DIASTOLIC BLOOD PRESSURE: 73 MMHG | WEIGHT: 184.2 LBS

## 2023-12-20 DIAGNOSIS — Z71.84 ENCOUNTER FOR COUNSELING FOR TRAVEL: Primary | ICD-10-CM

## 2023-12-20 DIAGNOSIS — Z23 NEED FOR IMMUNIZATION AGAINST TYPHOID: ICD-10-CM

## 2023-12-20 PROCEDURE — 90480 ADMN SARSCOV2 VAC 1/ONLY CMP: CPT | Performed by: PHYSICIAN ASSISTANT

## 2023-12-20 PROCEDURE — 91320 SARSCV2 VAC 30MCG TRS-SUC IM: CPT | Performed by: PHYSICIAN ASSISTANT

## 2023-12-20 PROCEDURE — 90471 IMMUNIZATION ADMIN: CPT | Mod: GA | Performed by: PHYSICIAN ASSISTANT

## 2023-12-20 PROCEDURE — 99401 PREV MED CNSL INDIV APPRX 15: CPT | Mod: 25 | Performed by: PHYSICIAN ASSISTANT

## 2023-12-20 PROCEDURE — 90691 TYPHOID VACCINE IM: CPT | Mod: GA | Performed by: PHYSICIAN ASSISTANT

## 2023-12-20 RX ORDER — AZITHROMYCIN 500 MG/1
TABLET, FILM COATED ORAL
Qty: 3 TABLET | Refills: 0 | Status: SHIPPED | OUTPATIENT
Start: 2023-12-20 | End: 2024-03-18

## 2023-12-20 RX ORDER — IBUPROFEN 600 MG/1
600 TABLET, FILM COATED ORAL EVERY 6 HOURS PRN
Qty: 90 TABLET | Refills: 0 | Status: SHIPPED | OUTPATIENT
Start: 2023-12-20

## 2023-12-20 RX ORDER — AZITHROMYCIN 500 MG/1
TABLET, FILM COATED ORAL
Qty: 3 TABLET | Refills: 0 | Status: SHIPPED | OUTPATIENT
Start: 2023-12-20 | End: 2023-12-20

## 2023-12-20 RX ORDER — IBUPROFEN 600 MG/1
600 TABLET, FILM COATED ORAL EVERY 6 HOURS PRN
Qty: 90 TABLET | Refills: 0 | Status: SHIPPED | OUTPATIENT
Start: 2023-12-20 | End: 2023-12-20

## 2023-12-20 RX ORDER — ACETAMINOPHEN 500 MG
500-1000 TABLET ORAL EVERY 6 HOURS PRN
Qty: 180 TABLET | Refills: 0 | Status: SHIPPED | OUTPATIENT
Start: 2023-12-20 | End: 2023-12-20

## 2023-12-20 RX ORDER — ACETAMINOPHEN 500 MG
500-1000 TABLET ORAL EVERY 6 HOURS PRN
Qty: 180 TABLET | Refills: 0 | Status: SHIPPED | OUTPATIENT
Start: 2023-12-20 | End: 2024-03-18

## 2023-12-20 NOTE — PROGRESS NOTES
SUBJECTIVE: Dandre Leyva , a 43 year old  female, presents for counseling and information regarding upcoming travel to [facility]. Special medical concerns include: none. She anticipates the following unusual exposures: none.    Itinerary:  [facility]    Departure Date: 1/8/24 Return date: 1/30/24    Reason for travel (i.e. Business, pleasure): pleasure    Visiting an urban or rural area?: rural    Accommodations (i.e. hotel, hostel, friends, family, etc): family    Women - First day of your last period: 12/16/23    IMMUNIZATION HISTORY  Have you received any vaccinations in the past 4 weeks?  No  Have you ever fainted from having your blood drawn or from an injection?  No  Have you ever had a fever reaction to vaccination?  yes  Have you ever had any bad reaction or side effect from any vaccination?  No  Have you ever had hepatitis A or B vaccine?  No  Do you live (or work closely) with anyone who has AIDS, an AIDS-like condition, any other immune disorder or who is on chemotherapy for cancer?  No  Have you received any injection of immune globulin or any blood products during the past 12 months?  No    GENERAL MEDICAL HISTORY  Do you have a medical condition that warrants maintenance medication or physician follow-up?  No  Do you have a medical condition that is stable now, but that may recur while traveling?  No  Has your spleen been removed?  No  Have you had an acute illness or a fever in the past 48 hours?  No  Are you pregnant, or might you become pregnant on this trip?  Any chance of pregnancy?  No  Are you breastfeeding?  No  Do you have HIV, AIDS, an AIDS-like condition, any other immune disorder, leukemia or cancer?  No  Do you have a severe combined immunodeficiency disease?  No  Have you had your thymus gland removed or history of problems with your thymus, such as myasthenia gravis, DiGeorge syndrome, or thymoma?  No    Do you have severe thrombocytopenia (low platelet count) or a coagulation disorder?   No  Have you ever had a convulsion, seizure, epilepsy, neurologic condition or brain infection?  No  Do you have any stomach conditions?  No  Do you have a G6PD deficiency?  No  Do you have severe renal or kidney impairment?  No  Do you have a history of psychiatric problems?  No  Do you have a problem with strange dreams and/or nightmares?  No  Do you have insomnia?  No  Do you have problems with vaginitis?  No  Do you have psoriasis?  No  Are you prone to motion sickness?  No  Have you ever had headaches, nausea, vomiting, or breathing problems from altitude exposure?  No      Past Medical History:   Diagnosis Date    Chlamydia vaginitis/cervicitis 10/31/2016    2016. Treated with negative test of cure 2016.    Fetal macrosomia in pregnancy ,     History of classical  section 2017    VBACx4    Infertility counseling 10/19/2015    Seeing [facility] for ovarian stimulation, Letrozole protocol. PCOS. H/o chlamydia. NEEDS HSG and semen analysis.  has erectile dysfunction.    Preeclampsia, severe 3/8/2018      Immunization History   Administered Date(s) Administered    COVID-19 Monovalent 18+ (Moderna) 2021, 2021    Flu, Unspecified 2010, 10/15/2018    [id number] Novel Flu 2010    HepA, Unspecified 2015    Hepatitis A Immunity: Titer/MD Dx 2015    Hepatitis B, Adult 07/15/2016, 2018    Influenza Vaccine >6 months,quad, PF 10/05/2018    Nasal Influenza Vaccine 2-49 (FluMist) 2015    TDAP (Adacel,Boostrix) 2004, 10/26/2015    Td (Adult), Adsorbed 2004    Varicella Immunity: Titer/MD Dx 2015    Varicella Zoster Immune Globulin 2015       Current Outpatient Medications   Medication Sig Dispense Refill    alcohol swabs (ALCOHOL WIPES) PadM [ALCOHOL SWABS (ALCOHOL WIPES) PADM] Use to check blood sugar once daily. 50 each 6    blood glucose test (CONTOUR NEXT TEST STRIPS) strips [BLOOD GLUCOSE TEST (CONTOUR NEXT  TEST STRIPS) STRIPS] Check fasting bg and 1 hour after each meal, 4 times daily. 120 strip 10    cefadroxil (DURICEF) 500 MG capsule [CEFADROXIL (DURICEF) 500 MG CAPSULE] TAKE 2 CAPSULES ON DAY 1, THEN TAKE ONE CAPSULE TWICE DAILY UNTIL GONE  0    generic lancets (MICROLET LANCET) [GENERIC LANCETS (MICROLET LANCET)] Check bg once daily. 50 each 6    HYDROcodone-acetaminophen 5-325 mg per tablet [HYDROCODONE-ACETAMINOPHEN 5-325 MG PER TABLET] TAKE 1-2 TABLETS BY MOUTH ONCE EVERY 4-6 HOURS AS NEEDED // IB ZAUG NOJ 1-2 LUB, 4-6 TEEV NOJ IB ZAUG YOG MOB  0    hydrOXYzine HCl (ATARAX) 25 MG tablet [HYDROXYZINE HCL (ATARAX) 25 MG TABLET] TAKE 1 CAPSULE ONCE EVERY 4 HOURS // IB ZAUG NOJ 1 LUB, 4 TEEV NOJ IB ZAUG  0    meclizine (ANTIVERT) 25 mg tablet [MECLIZINE (ANTIVERT) 25 MG TABLET] Take 1 tablet (25 mg total) by mouth 3 (three) times a day as needed for dizziness or nausea. 30 tablet 1    ondansetron (ZOFRAN-ODT) 8 MG disintegrating tablet [ONDANSETRON (ZOFRAN-ODT) 8 MG DISINTEGRATING TABLET] DISSOLVE 1 TABLET BY MOUTH EVERY 6 HOURS AS NEEDED  0    oxyCODONE-acetaminophen (PERCOCET/ENDOCET) 5-325 mg per tablet [OXYCODONE-ACETAMINOPHEN (PERCOCET/ENDOCET) 5-325 MG PER TABLET] TAKE 1-2 TABLETS BY MOUTH ONCE EVERY 4-6 HOURS AS NEEDED FOR PAIN. MAX 6 PER DAY // IB ZAUG NOJ 1-2 LUB, 4-6 TEEV NOJ IB ZAUG YOG MOB  0    rosuvastatin (CRESTOR) 40 MG tablet [ROSUVASTATIN (CRESTOR) 40 MG TABLET] Take 1 tablet (40 mg total) by mouth at bedtime. 90 tablet 3    temazepam (RESTORIL) 15 mg capsule [TEMAZEPAM (RESTORIL) 15 MG CAPSULE] TAKE 1 CAPSULE DAILY AT BED TIME AS NEEDED // 1 HNUB NOJ 1 LUB THAUM MUS PW YOG XAV NOJ  0     No Known Allergies     EXAM: deferred    Immunizations discussed include: Typhoid  Malaria prophylaxis recommended: not needed- Albin Narayan  Symptomatic treatment for traveler's diarrhea: bismuth subsalicylate, loperamide/diphenoxylate, and azithromycin    ASSESSMENT/PLAN:    (Z71.84) Encounter for counseling for  travel  (primary encounter diagnosis)    Comment: Typhoid and covid vaccines today. Patient will return or follow-up with PCP as needed. Prophylaxis given for Traveler's diarrhea and is not needed for Malaria. All questions were answered.     Plan: acetaminophen (TYLENOL) 500 MG tablet,         azithromycin (ZITHROMAX) 500 MG tablet,         ibuprofen (ADVIL/MOTRIN) 600 MG tablet,         DISCONTINUED: azithromycin (ZITHROMAX) 500 MG         tablet, DISCONTINUED: ibuprofen (ADVIL/MOTRIN)         600 MG tablet, DISCONTINUED: acetaminophen         (TYLENOL) 500 MG tablet            (Z23) Need for immunization against typhoid  Comment:   Plan: TYPHOID VACCINE, IM              I have reviewed general recommendations for safe travel   including: food/water precautions, insect avoidance, safe sex   practices given high prevalence of HIV and other STDs,   roadway safety. Educational materials and links to the CDC   Traveler's health website have been provided.    Total time 15 minutes, greater than 50 percent in counseling   and coordination of care.

## 2023-12-20 NOTE — PATIENT INSTRUCTIONS
"See travel packet provided  Recommend ultrathon (mosquito repellant), pepto bismol and imodium  The food and drink choices you make while traveling can impact your likelihood of getting sick.   If you aren't sure if a food or drink is safe, the saying \" BOIL IT, COOK IT, PEEL IT, OR FORGET IT\" can help you decide whether it's okay to consume.   Also bring hand  and sun screen with you.  Safe Travels       Today December 20, 2023 you received the    Typhoid - injectable. This vaccine is valid for two years. .    These appointments can be made as a NURSE ONLY visit.    **It is very important for the vaccinations to be given on the scheduled day(s), this helps ensure you receive the full effectiveness of the vaccine.**    Please call [facility] with any questions [phone]    Thank you for visiting [facility]    "

## 2023-12-20 NOTE — NURSING NOTE
Prior to immunization administration, verified patients identity using patient s name and date of birth. Please see Immunization Activity for additional information.     Screening Questionnaire for Adult Immunization    Are you sick today?   No   Do you have allergies to medications, food, a vaccine component or latex?   No   Have you ever had a serious reaction after receiving a vaccination?   No   Do you have a long-term health problem with heart, lung, kidney, or metabolic disease (e.g., diabetes), asthma, a blood disorder, no spleen, complement component deficiency, a cochlear implant, or a spinal fluid leak?  Are you on long-term aspirin therapy?   No   Do you have cancer, leukemia, HIV/AIDS, or any other immune system problem?   No   Do you have a parent, brother, or sister with an immune system problem?   No   In the past 3 months, have you taken medications that affect  your immune system, such as prednisone, other steroids, or anticancer drugs; drugs for the treatment of rheumatoid arthritis, Crohn s disease, or psoriasis; or have you had radiation treatments?   No   Have you had a seizure, or a brain or other nervous system problem?   No   During the past year, have you received a transfusion of blood or blood    products, or been given immune (gamma) globulin or antiviral drug?   No   For women: Are you pregnant or is there a chance you could become       pregnant during the next month?   No   Have you received any vaccinations in the past 4 weeks?   No     Immunization questionnaire answers were all negative.      Patient instructed to remain in clinic for 15 minutes afterwards, and to report any adverse reactions.     Screening performed by Qi Sipmson MA on 12/20/2023 at 3:35 PM.

## 2024-03-14 ENCOUNTER — TELEPHONE (OUTPATIENT)
Dept: FAMILY MEDICINE | Facility: CLINIC | Age: 44
End: 2024-03-14
Payer: COMMERCIAL

## 2024-03-14 NOTE — TELEPHONE ENCOUNTER
Reason for Call:  Appointment Request    Patient requesting this type of appt:  Wart on finger    Requested provider: Perri Day    Reason patient unable to be scheduled: Not within requested timeframe    When does patient want to be seen/preferred time: 3-7 days    Comments: Patient is requesting to schedule an appointment with the listed provider due to a wart on her index finger. She would like to be seen sometime next week and does not wish to schedule with a different provider.     Okay to leave a detailed message?: Yes at Home number on file [phone] (home)    Call taken on 3/14/2024 at 10:48 AM by Ariane Ambrocio

## 2024-03-15 NOTE — TELEPHONE ENCOUNTER
S-(situation):    Patient is requesting to schedule an appointment with the listed provider due to a wart on her index finger. She would like to be seen sometime next week and does not wish to schedule with a different provider.       B-(background):   Last appointment to Dr Day since 12/29/2019    A-(assessment):   Wart on right index finger  Mild pain and redness  Patient has used OTC wart treatment and did not work  Normal use of hand / finger   Hole on wart    R-(recommendations):   Appointment scheduled on 3/18/24 with Dr Yoni Stanford RN  [facility]

## 2024-03-18 ENCOUNTER — OFFICE VISIT (OUTPATIENT)
Dept: FAMILY MEDICINE | Facility: CLINIC | Age: 44
End: 2024-03-18
Payer: COMMERCIAL

## 2024-03-18 VITALS
BODY MASS INDEX: 33.13 KG/M2 | SYSTOLIC BLOOD PRESSURE: 131 MMHG | HEIGHT: 62 IN | HEART RATE: 91 BPM | RESPIRATION RATE: 18 BRPM | OXYGEN SATURATION: 97 % | WEIGHT: 180 LBS | DIASTOLIC BLOOD PRESSURE: 82 MMHG | TEMPERATURE: 98.2 F

## 2024-03-18 DIAGNOSIS — E11.65 TYPE 2 DIABETES MELLITUS WITH HYPERGLYCEMIA, WITHOUT LONG-TERM CURRENT USE OF INSULIN (H): ICD-10-CM

## 2024-03-18 DIAGNOSIS — B07.8 COMMON WART: Primary | ICD-10-CM

## 2024-03-18 DIAGNOSIS — Z12.31 VISIT FOR SCREENING MAMMOGRAM: ICD-10-CM

## 2024-03-18 DIAGNOSIS — Z11.59 NEED FOR HEPATITIS C SCREENING TEST: ICD-10-CM

## 2024-03-18 DIAGNOSIS — Z12.4 CERVICAL CANCER SCREENING: ICD-10-CM

## 2024-03-18 PROCEDURE — 99213 OFFICE O/P EST LOW 20 MIN: CPT | Performed by: FAMILY MEDICINE

## 2024-03-18 NOTE — PROGRESS NOTES
"  1. Common wart  Patient has a common wart at base of right index finger - discussed options for treatment - offered liquid nitrogen treatment - declined.  Will do OTC treatment.      2. Type 2 diabetes mellitus with hyperglycemia, without long-term current use of insulin (H)  Type II DM - declines follow up today - encourage follow up with primary provider.      3. Visit for screening mammogram  Due for breast cancer screening - discussed - declines     4. Need for hepatitis C screening test  Due for hep C screening - not discussed -     5. Cervical cancer screening  Due for cervix cancer screening - discussed - declines -       Milton Amos is a 44 year old, presenting for the following health issues:  Wart        3/18/2024     5:04 PM   Additional Questions   Roomed by Paris   Accompanied by Spouse     History of Present Illness       Reason for visit:  Painful wart on bottom of right pointer finger, wants medication        Review of Systems   Constitutional:  Negative for chills and fever.   HENT:  Negative for ear pain and sore throat.    Eyes:  Negative for pain and visual disturbance.   Respiratory:  Negative for cough and shortness of breath.    Cardiovascular:  Negative for chest pain and palpitations.   Gastrointestinal:  Negative for abdominal pain and vomiting.   Genitourinary:  Negative for dysuria and hematuria.   Musculoskeletal:  Negative for arthralgias and back pain.   Skin:  Negative for color change and rash.        Wart on right index finger   Neurological:  Negative for seizures and syncope.   All other systems reviewed and are negative.          Objective    /82 (BP Location: Left arm, Patient Position: Sitting, Cuff Size: Adult Regular)   Pulse 91   Temp 98.2  F (36.8  C) (Temporal)   Resp 18   Ht 1.57 m (5' 1.81\")   Wt 81.6 kg (180 lb)   LMP 03/15/2024 (Exact Date)   SpO2 97%   BMI 33.12 kg/m    Body mass index is 33.12 kg/m .  Physical Exam  Vitals reviewed. "   Constitutional:       General: She is not in acute distress.     Appearance: Normal appearance.   HENT:      Head: Normocephalic.      Right Ear: Tympanic membrane, ear canal and external ear normal.      Left Ear: Tympanic membrane, ear canal and external ear normal.      Nose: Nose normal.      Mouth/Throat:      Mouth: Mucous membranes are moist.      Pharynx: No posterior oropharyngeal erythema.   Eyes:      Extraocular Movements: Extraocular movements intact.      Conjunctiva/sclera: Conjunctivae normal.      Pupils: Pupils are equal, round, and reactive to light.   Cardiovascular:      Rate and Rhythm: Normal rate and regular rhythm.      Pulses: Normal pulses.      Heart sounds: Normal heart sounds. No murmur heard.  Pulmonary:      Effort: Pulmonary effort is normal.      Breath sounds: Normal breath sounds.   Abdominal:      Palpations: Abdomen is soft. There is no mass.      Tenderness: There is no abdominal tenderness. There is no guarding or rebound.   Musculoskeletal:         General: No deformity. Normal range of motion.      Cervical back: Normal range of motion and neck supple.   Lymphadenopathy:      Cervical: No cervical adenopathy.   Skin:     General: Skin is warm and dry.      Findings: Lesion (warty lesion proximal right index finger) present.   Neurological:      General: No focal deficit present.      Mental Status: She is alert.   Psychiatric:         Mood and Affect: Mood normal.         Behavior: Behavior normal.            No results found for any visits on 03/18/24.        Signed Electronically by: GRUPO RAMIREZ MD      Prior to immunization administration, verified patients identity using patient s name and date of birth. Please see Immunization Activity for additional information.     Screening Questionnaire for Adult Immunization    Are you sick today?   No   Do you have allergies to medications, food, a vaccine component or latex?   No   Have you ever had a serious  reaction after receiving a vaccination?   No   Do you have a long-term health problem with heart, lung, kidney, or metabolic disease (e.g., diabetes), asthma, a blood disorder, no spleen, complement component deficiency, a cochlear implant, or a spinal fluid leak?  Are you on long-term aspirin therapy?   No   Do you have cancer, leukemia, HIV/AIDS, or any other immune system problem?   No   Do you have a parent, brother, or sister with an immune system problem?   No   In the past 3 months, have you taken medications that affect  your immune system, such as prednisone, other steroids, or anticancer drugs; drugs for the treatment of rheumatoid arthritis, Crohn s disease, or psoriasis; or have you had radiation treatments?   No   Have you had a seizure, or a brain or other nervous system problem?   No   During the past year, have you received a transfusion of blood or blood    products, or been given immune (gamma) globulin or antiviral drug?   No   For women: Are you pregnant or is there a chance you could become       pregnant during the next month?   No   Have you received any vaccinations in the past 4 weeks?   Don't Know     Immunization questionnaire answers were all negative.      Patient instructed to remain in clinic for 15 minutes afterwards, and to report any adverse reactions.     Screening performed by Paris Valdez CMA on 3/18/2024 at 5:07 PM.

## 2024-03-24 ASSESSMENT — ENCOUNTER SYMPTOMS
COUGH: 0
ABDOMINAL PAIN: 0
ARTHRALGIAS: 0
CHILLS: 0
COLOR CHANGE: 0
SHORTNESS OF BREATH: 0
EYE PAIN: 0
HEMATURIA: 0
DYSURIA: 0
BACK PAIN: 0
SEIZURES: 0
FEVER: 0
VOMITING: 0
PALPITATIONS: 0
SORE THROAT: 0

## 2024-12-04 ENCOUNTER — APPOINTMENT (OUTPATIENT)
Dept: INTERPRETER SERVICES | Facility: CLINIC | Age: 44
End: 2024-12-04
Payer: COMMERCIAL